# Patient Record
Sex: MALE | Race: WHITE | URBAN - METROPOLITAN AREA
[De-identification: names, ages, dates, MRNs, and addresses within clinical notes are randomized per-mention and may not be internally consistent; named-entity substitution may affect disease eponyms.]

---

## 2017-03-08 ENCOUNTER — OFFICE VISIT (OUTPATIENT)
Dept: FAMILY MEDICINE | Facility: CLINIC | Age: 57
End: 2017-03-08
Payer: COMMERCIAL

## 2017-03-08 VITALS
SYSTOLIC BLOOD PRESSURE: 124 MMHG | HEART RATE: 82 BPM | TEMPERATURE: 98.8 F | DIASTOLIC BLOOD PRESSURE: 82 MMHG | BODY MASS INDEX: 25.54 KG/M2 | RESPIRATION RATE: 16 BRPM | OXYGEN SATURATION: 99 % | WEIGHT: 178.4 LBS | HEIGHT: 70 IN

## 2017-03-08 DIAGNOSIS — E78.5 HYPERLIPIDEMIA LDL GOAL <130: ICD-10-CM

## 2017-03-08 DIAGNOSIS — Z11.59 NEED FOR HEPATITIS C SCREENING TEST: ICD-10-CM

## 2017-03-08 DIAGNOSIS — Z00.00 ROUTINE GENERAL MEDICAL EXAMINATION AT A HEALTH CARE FACILITY: Primary | ICD-10-CM

## 2017-03-08 DIAGNOSIS — G89.29 CHRONIC BILATERAL LOW BACK PAIN WITHOUT SCIATICA: ICD-10-CM

## 2017-03-08 DIAGNOSIS — R68.82 LOW LIBIDO: ICD-10-CM

## 2017-03-08 DIAGNOSIS — M54.50 CHRONIC BILATERAL LOW BACK PAIN WITHOUT SCIATICA: ICD-10-CM

## 2017-03-08 LAB
ALBUMIN SERPL-MCNC: 4.1 G/DL (ref 3.4–5)
ALBUMIN UR-MCNC: NEGATIVE MG/DL
ALP SERPL-CCNC: 74 U/L (ref 40–150)
ALT SERPL W P-5'-P-CCNC: 74 U/L (ref 0–70)
ANION GAP SERPL CALCULATED.3IONS-SCNC: 4 MMOL/L (ref 3–14)
APPEARANCE UR: CLEAR
AST SERPL W P-5'-P-CCNC: 36 U/L (ref 0–45)
BILIRUB SERPL-MCNC: 0.4 MG/DL (ref 0.2–1.3)
BILIRUB UR QL STRIP: NEGATIVE
BUN SERPL-MCNC: 12 MG/DL (ref 7–30)
CALCIUM SERPL-MCNC: 9.2 MG/DL (ref 8.5–10.1)
CHLORIDE SERPL-SCNC: 106 MMOL/L (ref 94–109)
CHOLEST SERPL-MCNC: 363 MG/DL
CO2 SERPL-SCNC: 30 MMOL/L (ref 20–32)
COLOR UR AUTO: YELLOW
CREAT SERPL-MCNC: 0.97 MG/DL (ref 0.66–1.25)
ERYTHROCYTE [DISTWIDTH] IN BLOOD BY AUTOMATED COUNT: 13.9 % (ref 10–15)
GFR SERPL CREATININE-BSD FRML MDRD: 80 ML/MIN/1.7M2
GLUCOSE SERPL-MCNC: 95 MG/DL (ref 70–99)
GLUCOSE UR STRIP-MCNC: NEGATIVE MG/DL
HCT VFR BLD AUTO: 49.8 % (ref 40–53)
HCV AB SERPL QL IA: NORMAL
HDLC SERPL-MCNC: 39 MG/DL
HGB BLD-MCNC: 16.7 G/DL (ref 13.3–17.7)
HGB UR QL STRIP: NEGATIVE
KETONES UR STRIP-MCNC: NEGATIVE MG/DL
LDLC SERPL CALC-MCNC: 271 MG/DL
LEUKOCYTE ESTERASE UR QL STRIP: NEGATIVE
MCH RBC QN AUTO: 29.7 PG (ref 26.5–33)
MCHC RBC AUTO-ENTMCNC: 33.5 G/DL (ref 31.5–36.5)
MCV RBC AUTO: 89 FL (ref 78–100)
NITRATE UR QL: NEGATIVE
NONHDLC SERPL-MCNC: 324 MG/DL
PH UR STRIP: 6 PH (ref 5–7)
PLATELET # BLD AUTO: 228 10E9/L (ref 150–450)
POTASSIUM SERPL-SCNC: 4 MMOL/L (ref 3.4–5.3)
PROT SERPL-MCNC: 7.4 G/DL (ref 6.8–8.8)
RBC # BLD AUTO: 5.62 10E12/L (ref 4.4–5.9)
SODIUM SERPL-SCNC: 140 MMOL/L (ref 133–144)
SP GR UR STRIP: <=1.005 (ref 1–1.03)
TRIGL SERPL-MCNC: 265 MG/DL
URN SPEC COLLECT METH UR: NORMAL
UROBILINOGEN UR STRIP-ACNC: 0.2 EU/DL (ref 0.2–1)
WBC # BLD AUTO: 6.3 10E9/L (ref 4–11)

## 2017-03-08 PROCEDURE — 80053 COMPREHEN METABOLIC PANEL: CPT | Performed by: FAMILY MEDICINE

## 2017-03-08 PROCEDURE — 86803 HEPATITIS C AB TEST: CPT | Performed by: FAMILY MEDICINE

## 2017-03-08 PROCEDURE — 81003 URINALYSIS AUTO W/O SCOPE: CPT | Performed by: FAMILY MEDICINE

## 2017-03-08 PROCEDURE — 80061 LIPID PANEL: CPT | Performed by: FAMILY MEDICINE

## 2017-03-08 PROCEDURE — 36415 COLL VENOUS BLD VENIPUNCTURE: CPT | Performed by: FAMILY MEDICINE

## 2017-03-08 PROCEDURE — 85027 COMPLETE CBC AUTOMATED: CPT | Performed by: FAMILY MEDICINE

## 2017-03-08 PROCEDURE — 99396 PREV VISIT EST AGE 40-64: CPT | Performed by: FAMILY MEDICINE

## 2017-03-08 PROCEDURE — 84403 ASSAY OF TOTAL TESTOSTERONE: CPT | Performed by: FAMILY MEDICINE

## 2017-03-08 NOTE — PROGRESS NOTES
SUBJECTIVE:     CC: Noble Blanco is an 56 year old male who presents for preventative health visit.     Healthy Habits:    Do you get at least three servings of calcium containing foods daily (dairy, green leafy vegetables, etc.)? yes    Amount of exercise or daily activities, outside of work: 4 day(s) per week    Problems taking medications regularly No    Medication side effects: No    Have you had an eye exam in the past two years? yes    Do you see a dentist twice per year? yes    Do you have sleep apnea, excessive snoring or daytime drowsiness?no    Patient with significant hyperlipidemia in the past however has had myalgias with atorvastatin as well as just periodic use of Crestor at 5 mg.    Today's PHQ-2 Score:   PHQ-2 ( 1999 Pfizer) 6/30/2016 6/11/2015   Q1: Little interest or pleasure in doing things 0 -   Q2: Feeling down, depressed or hopeless 0 0   PHQ-2 Score 0 -     Abuse: Current or Past(Physical, Sexual or Emotional)- No  Do you feel safe in your environment - Yes    Social History   Substance Use Topics     Smoking status: Never Smoker     Smokeless tobacco: Never Used     Alcohol use No     The patient does not drink >3 drinks per day nor >7 drinks per week.    Last PSA:   PSA   Date Value Ref Range Status   05/10/2016 1.80 ng/mL Final     Recent Labs   Lab Test 05/10/16  06/11/15   0832  04/07/14   0825   CHOL  346  174  189   HDL  46  47  39*   LDL  248  99  122   TRIG  258  142  138   CHOLHDLRATIO   --   3.7  4.9     Reviewed orders with patient. Reviewed health maintenance and updated orders accordingly - Yes    Reviewed and updated as needed this visit by clinical staff  Tobacco  Allergies  Meds  Problems  Med Hx  Surg Hx  Fam Hx  Soc Hx        Reviewed and updated as needed this visit by Provider  Allergies  Meds  Problems        Past Medical History   Diagnosis Date     Hyperlipidemia       Past Surgical History   Procedure Laterality Date     C nonspecific procedure       S/P  "T&A (septal turbination repair)     C nonspecific procedure       S/P Vasectomy     C nonspecific procedure       Right foot fracture     C nonspecific procedure       L1-L2      Orthopedic surgery       rt shoulder surgery     Appendectomy       Colonoscopy  9/21/2012     Procedure: COLONOSCOPY;  Colonoscopy  ;  Surgeon: Vahid Kuhn MD;  Location: [facility]     ROS:  C: NEGATIVE for fever, chills, change in weight  I: NEGATIVE for worrisome rashes, moles or lesions  E: NEGATIVE for vision changes or irritation  ENT: NEGATIVE for ear, mouth and throat problems  R: NEGATIVE for significant cough or SOB  CV: NEGATIVE for chest pain, palpitations or peripheral edema  GI: NEGATIVE for nausea, abdominal pain, heartburn, or change in bowel habits   male: negative for dysuria, hematuria, decreased urinary stream, erectile dysfunction, urethral discharge  M: NEGATIVE for significant arthralgias or myalgia  N: NEGATIVE for weakness, dizziness or paresthesias  P: NEGATIVE for changes in mood or affect    Problem list, Medication list, Allergies, and Medical/Social/Surgical histories reviewed in EPIC and updated as appropriate.  OBJECTIVE:     /82 (BP Location: Right arm, Patient Position: Chair, Cuff Size: Adult Large)  Pulse 82  Temp 98.8  F (37.1  C) (Oral)  Resp 16  Ht 5' 10\" (1.778 m)  Wt 178 lb 6.4 oz (80.9 kg)  SpO2 99%  BMI 25.6 kg/m2  EXAM:  GENERAL: healthy, alert and no distress  EYES: Eyes grossly normal to inspection, PERRL and conjunctivae and sclerae normal  HENT: ear canals and TM's normal, nose and mouth without ulcers or lesions  NECK: no adenopathy, no asymmetry, masses, or scars and thyroid normal to palpation  RESP: lungs clear to auscultation - no rales, rhonchi or wheezes  CV: regular rate and rhythm, normal S1 S2, no S3 or S4, no murmur, click or rub, no peripheral edema and peripheral pulses strong  ABDOMEN: soft, nontender, no hepatosplenomegaly, no masses and bowel sounds normal   " "(male): normal male genitalia without lesions or urethral discharge, no hernia  MS: no gross musculoskeletal defects noted, no edema  SKIN: no suspicious lesions or rashes  NEURO: Normal strength and tone, mentation intact and speech normal  PSYCH: mentation appears normal, affect normal/bright    ASSESSMENT/PLAN:     1. Routine general medical examination at a health care facility  - Comprehensive metabolic panel    2. Hyperlipidemia LDL goal <130  - Lipid panel reflex to direct LDL  - UA reflex to Microscopic and Culture  - CBC with platelets    3. Low libido  - Testosterone total    4. Need for hepatitis C screening test  - Hepatitis C Screen Reflex to HCV RNA Quant and Genotype    5. Chronic bilateral low back pain without sciatica    COUNSELING:  Reviewed preventive health counseling, as reflected in patient instructions    BP Screening:   Last 3 BP Readings:    BP Readings from Last 3 Encounters:   03/08/17 124/82   06/30/16 124/74   06/11/15 110/80       The following was recommended to the patient:  Re-screen BP within a year and recommended lifestyle modifications     reports that he has never smoked. He has never used smokeless tobacco.    Estimated body mass index is 25.6 kg/(m^2) as calculated from the following:    Height as of this encounter: 5' 10\" (1.778 m).    Weight as of this encounter: 178 lb 6.4 oz (80.9 kg).   Weight management plan: Discussed healthy diet and exercise guidelines and patient will follow up in 12 months in clinic to re-evaluate.    Counseling Resources:  ATP IV Guidelines  Pooled Cohorts Equation Calculator  FRAX Risk Assessment  ICSI Preventive Guidelines  Dietary Guidelines for Americans, 2010  USDA's MyPlate  ASA Prophylaxis  Lung CA Screening    Ferdinand Gutierrez MD  [facility]  "

## 2017-03-08 NOTE — NURSING NOTE
"Chief Complaint   Patient presents with     Physical       Initial /82 (BP Location: Right arm, Patient Position: Chair, Cuff Size: Adult Large)  Pulse 82  Temp 98.8  F (37.1  C) (Oral)  Resp 16  Ht 5' 10\" (1.778 m)  Wt 178 lb 6.4 oz (80.9 kg)  SpO2 99%  BMI 25.6 kg/m2 Estimated body mass index is 25.6 kg/(m^2) as calculated from the following:    Height as of this encounter: 5' 10\" (1.778 m).    Weight as of this encounter: 178 lb 6.4 oz (80.9 kg).    Medication Reconciliation: complete    Ana Maria Jacinto, [facility]      Health Maintenance- Has Been Reviewed.                "

## 2017-03-08 NOTE — MR AVS SNAPSHOT
After Visit Summary   3/8/2017    Noble Blanco    MRN: [id number]           Patient Information     Date Of Birth          1960        Visit Information        Provider Department      3/8/2017 7:30 AM Ferdinand Gutierrez MD [facility]        Today's Diagnoses     Routine general medical examination at a health care facility    -  1    Hyperlipidemia LDL goal <130        Low libido        Need for hepatitis C screening test          Care Instructions      Preventive Health Recommendations  Male Ages 50 - 64    Yearly exam:             See your health care provider every year in order to  o   Review health changes.   o   Discuss preventive care.    o   Review your medicines if your doctor has prescribed any.     Have a cholesterol test every 5 years, or more frequently if you are at risk for high cholesterol/heart disease.     Have a diabetes test (fasting glucose) every three years. If you are at risk for diabetes, you should have this test more often.     Have a colonoscopy at age 50, or have a yearly FIT test (stool test). These exams will check for colon cancer.      Talk with your health care provider about whether or not a prostate cancer screening test (PSA) is right for you.    You should be tested each year for STDs (sexually transmitted diseases), if you re at risk.     Shots: Get a flu shot each year. Get a tetanus shot every 10 years.     Nutrition:    Eat at least 5 servings of fruits and vegetables daily.     Eat whole-grain bread, whole-wheat pasta and brown rice instead of white grains and rice.     Talk to your provider about Calcium and Vitamin D.     Lifestyle    Exercise for at least 150 minutes a week (30 minutes a day, 5 days a week). This will help you control your weight and prevent disease.     Limit alcohol to one drink per day.     No smoking.     Wear sunscreen to prevent skin cancer.     See your dentist every six months for an exam and cleaning.  "    See your eye doctor every 1 to 2 years.          Follow-ups after your visit        Who to contact     If you have questions or need follow up information about today's clinic visit or your schedule please contact [facility] directly at [phone].  Normal or non-critical lab and imaging results will be communicated to you by MyChart, letter or phone within 4 business days after the clinic has received the results. If you do not hear from us within 7 days, please contact the clinic through [employer] or phone. If you have a critical or abnormal lab result, we will notify you by phone as soon as possible.  Submit refill requests through [employer] or call your pharmacy and they will forward the refill request to us. Please allow 3 business days for your refill to be completed.          Additional Information About Your Visit        [employer] Information     [employer] gives you secure access to your electronic health record. If you see a primary care provider, you can also send messages to your care team and make appointments. If you have questions, please call your primary care clinic.  If you do not have a primary care provider, please call [phone] and they will assist you.        Care EveryWhere ID     This is your Care EveryWhere ID. This could be used by other organizations to access your [place] medical records  [id number]        Your Vitals Were     Pulse Temperature Respirations Height Pulse Oximetry BMI (Body Mass Index)    82 98.8  F (37.1  C) (Oral) 16 5' 10\" (1.778 m) 99% 25.6 kg/m2       Blood Pressure from Last 3 Encounters:   03/08/17 124/82   06/30/16 124/74   06/11/15 110/80    Weight from Last 3 Encounters:   03/08/17 178 lb 6.4 oz (80.9 kg)   06/30/16 172 lb (78 kg)   06/11/15 169 lb (76.7 kg)              We Performed the Following     CBC with platelets     Comprehensive metabolic panel     Hepatitis C Screen Reflex to HCV RNA Quant and Genotype     Lipid panel reflex to direct " LDL     Testosterone total     UA reflex to Microscopic and Culture          Today's Medication Changes          These changes are accurate as of: 3/8/17  8:22 AM.  If you have any questions, ask your nurse or doctor.               Stop taking these medicines if you haven't already. Please contact your care team if you have questions.     aspirin 81 MG tablet   Stopped by:  Ferdinand Gutierrez MD           rosuvastatin 5 MG tablet   Commonly known as:  CRESTOR   Stopped by:  Ferdinand Gutierrez MD                    Primary Care Provider Office Phone # Fax #    Ferdinand Gutierrez [phone]       [facility] [address]  [facility] [address]        Thank you!     Thank you for choosing [facility]  for your care. Our goal is always to provide you with excellent care. Hearing back from our patients is one way we can continue to improve our services. Please take a few minutes to complete the written survey that you may receive in the mail after your visit with us. Thank you!             Your Updated Medication List - Protect others around you: Learn how to safely use, store and throw away your medicines at www.disposemymeds.org.          This list is accurate as of: 3/8/17  8:22 AM.  Always use your most recent med list.                   Brand Name Dispense Instructions for use    fexofenadine 180 MG tablet    ALLEGRA     Take by mouth daily       FLEXERIL PO      Take 10 mg by mouth once as needed for muscle spasms       melatonin 3 MG tablet     30 tablet    Take 1 tablet by mouth nightly as needed for sleep.       multivitamin per tablet     100 tablet    Take 1 tablet by mouth daily.       SUPER OMEGA 3 EPA/DHA 1000 MG Caps      Take  by mouth At Bedtime.       vitamin D 1000 UNITS capsule      Take 2,000 Units by mouth daily

## 2017-03-08 NOTE — PATIENT INSTRUCTIONS
Preventive Health Recommendations  Male Ages 50   64    Yearly exam:             See your health care provider every year in order to  o   Review health changes.   o   Discuss preventive care.    o   Review your medicines if your doctor has prescribed any.     Have a cholesterol test every 5 years, or more frequently if you are at risk for high cholesterol/heart disease.     Have a diabetes test (fasting glucose) every three years. If you are at risk for diabetes, you should have this test more often.     Have a colonoscopy at age 50, or have a yearly FIT test (stool test). These exams will check for colon cancer.      Talk with your health care provider about whether or not a prostate cancer screening test (PSA) is right for you.    You should be tested each year for STDs (sexually transmitted diseases), if you re at risk.     Shots: Get a flu shot each year. Get a tetanus shot every 10 years.     Nutrition:    Eat at least 5 servings of fruits and vegetables daily.     Eat whole-grain bread, whole-wheat pasta and brown rice instead of white grains and rice.     Talk to your provider about Calcium and Vitamin D.     Lifestyle    Exercise for at least 150 minutes a week (30 minutes a day, 5 days a week). This will help you control your weight and prevent disease.     Limit alcohol to one drink per day.     No smoking.     Wear sunscreen to prevent skin cancer.     See your dentist every six months for an exam and cleaning.     See your eye doctor every 1 to 2 years.

## 2017-03-10 PROBLEM — E78.00 PURE HYPERCHOLESTEROLEMIA: Status: ACTIVE | Noted: 2017-03-10

## 2017-03-10 LAB — TESTOST SERPL-MCNC: 614 NG/DL (ref 240–950)

## 2017-09-25 ENCOUNTER — OFFICE VISIT (OUTPATIENT)
Dept: FAMILY MEDICINE | Facility: CLINIC | Age: 57
End: 2017-09-25
Payer: COMMERCIAL

## 2017-09-25 ENCOUNTER — RADIANT APPOINTMENT (OUTPATIENT)
Dept: GENERAL RADIOLOGY | Facility: CLINIC | Age: 57
End: 2017-09-25
Attending: PHYSICIAN ASSISTANT
Payer: COMMERCIAL

## 2017-09-25 VITALS — TEMPERATURE: 98.2 F | HEART RATE: 72 BPM | DIASTOLIC BLOOD PRESSURE: 78 MMHG | SYSTOLIC BLOOD PRESSURE: 130 MMHG

## 2017-09-25 DIAGNOSIS — S69.91XA INJURY OF RIGHT LITTLE FINGER, INITIAL ENCOUNTER: ICD-10-CM

## 2017-09-25 DIAGNOSIS — S69.91XA INJURY OF RIGHT LITTLE FINGER, INITIAL ENCOUNTER: Primary | ICD-10-CM

## 2017-09-25 PROCEDURE — 99213 OFFICE O/P EST LOW 20 MIN: CPT | Performed by: PHYSICIAN ASSISTANT

## 2017-09-25 PROCEDURE — 73140 X-RAY EXAM OF FINGER(S): CPT | Mod: RT

## 2017-09-25 NOTE — NURSING NOTE
"Chief Complaint   Patient presents with     Trauma     R hand injury       Initial /78 (BP Location: Right arm, Patient Position: Chair, Cuff Size: Adult Regular)  Pulse 72  Temp 98.2  F (36.8  C) (Oral) Estimated body mass index is 25.6 kg/(m^2) as calculated from the following:    Height as of 3/8/17: 5' 10\" (1.778 m).    Weight as of 3/8/17: 178 lb 6.4 oz (80.9 kg).  Medication Reconciliation: complete     Mckenzie Miller CMA (AAMA)        "

## 2017-09-25 NOTE — MR AVS SNAPSHOT
After Visit Summary   9/25/2017    Noble Blanco    MRN: [id number]           Patient Information     Date Of Birth          1960        Visit Information        Provider Department      9/25/2017 4:30 PM Roland Kan PA-C [facility]        Today's Diagnoses     Injury of right little finger, initial encounter    -  1       Follow-ups after your visit        Additional Services     ORTHO  REFERRAL       [facility] is referring you to the Orthopedic  Services at [facility].       The  Representative will assist you in the coordination of your Orthopedic and Musculoskeletal Care as prescribed by your physician.    The  Representative will call you within 1 business day to help schedule your appointment, or you may contact the  Representative at:    All areas ~ [phone]     Type of Referral : Hand Surgeon       Timeframe requested: 3 - 5 days    Coverage of these services is subject to the terms and limitations of your health insurance plan.  Please call member services at your health plan with any benefit or coverage questions.      If X-rays, CT or MRI's have been performed, please contact the facility where they were done to arrange for , prior to your scheduled appointment.  Please bring this referral request to your appointment and present it to your specialist.    3 week old injury to PIP joint with decreased ROM and PIP joint swelling                  Who to contact     If you have questions or need follow up information about today's clinic visit or your schedule please contact [facility] directly at [phone].  Normal or non-critical lab and imaging results will be communicated to you by MyChart, letter or phone within 4 business days after the clinic has received the results. If you do not hear from us within 7 days, please contact the clinic through  [employer] or phone. If you have a critical or abnormal lab result, we will notify you by phone as soon as possible.  Submit refill requests through [employer] or call your pharmacy and they will forward the refill request to us. Please allow 3 business days for your refill to be completed.          Additional Information About Your Visit        [employer] Information     [employer] gives you secure access to your electronic health record. If you see a primary care provider, you can also send messages to your care team and make appointments. If you have questions, please call your primary care clinic.  If you do not have a primary care provider, please call [phone] and they will assist you.        Care EveryWhere ID     This is your Care EveryWhere ID. This could be used by other organizations to access your [place] medical records  [id number]        Your Vitals Were     Pulse Temperature                72 98.2  F (36.8  C) (Oral)           Blood Pressure from Last 3 Encounters:   09/25/17 130/78   03/08/17 124/82   06/30/16 124/74    Weight from Last 3 Encounters:   03/08/17 178 lb 6.4 oz (80.9 kg)   06/30/16 172 lb (78 kg)   06/11/15 169 lb (76.7 kg)              We Performed the Following     ORTHO  REFERRAL        Primary Care Provider Office Phone # Fax #    Ferdinand Gutierrez [phone] [address]  [facility] [address]        Equal Access to Services     [facility] AH: Hadii aad ku hadasho Soomaali, waaxda luqadaha, qaybta kaalmada adeegyada, konrad suazo . So [facility] [phone].    ATENCIÓN: Si habla español, tiene a matamoros disposición servicios gratuitos de asistencia lingüística. Llame al [phone].    We comply with applicable federal civil rights laws and [place] laws. We do not discriminate on the basis of race, color, national origin, age, disability sex, sexual orientation or gender identity.            Thank you!     Thank you for choosing  [facility]  for your care. Our goal is always to provide you with excellent care. Hearing back from our patients is one way we can continue to improve our services. Please take a few minutes to complete the written survey that you may receive in the mail after your visit with us. Thank you!             Your Updated Medication List - Protect others around you: Learn how to safely use, store and throw away your medicines at www.disposemymeds.org.          This list is accurate as of: 9/25/17  4:51 PM.  Always use your most recent med list.                   Brand Name Dispense Instructions for use Diagnosis    fexofenadine 180 MG tablet    ALLEGRA     Take by mouth daily        FLEXERIL PO      Take 10 mg by mouth once as needed for muscle spasms        melatonin 3 MG tablet     30 tablet    Take 1 tablet by mouth nightly as needed for sleep.        multivitamin per tablet     100 tablet    Take 1 tablet by mouth daily.        SUPER OMEGA 3 EPA/DHA 1000 MG Caps      Take  by mouth At Bedtime.        vitamin D 1000 UNITS capsule      Take 2,000 Units by mouth daily

## 2017-09-25 NOTE — PROGRESS NOTES
SUBJECTIVE:   Noble Blanco is a 56 year old male who presents to clinic today for the following health issues:      Pt stated that he was working on a deck and went down fast and hurt his R pinky -2.5 weeks ago, bent wrong and had been swollen but got better. Still swollen.    SUBJECTIVE:  Chief Complaint   Patient presents with     Trauma     R hand injury     Noble Blanco is a 56 year old male presents with a chief complaint of right small finger PIP joint pain and swelling   This is a work comp related injury date of injury was 3 weeks ago.  He had sustained a crush injury when he was working on a deck and the deck fell down and crushed his right pinky finger.  At the time it has swollen and now the swelling has Improved but not has not completely resolved.      Past Medical History:   Diagnosis Date     Hyperlipidemia      Current Outpatient Prescriptions   Medication Sig Dispense Refill     fexofenadine (ALLEGRA) 180 MG tablet Take by mouth daily       Cyclobenzaprine HCl (FLEXERIL PO) Take 10 mg by mouth once as needed for muscle spasms       Omega-3 Fatty Acids (SUPER OMEGA 3 EPA/DHA) 1000 MG CAPS Take  by mouth At Bedtime.       Cholecalciferol (VITAMIN D) 1000 UNITS capsule Take 2,000 Units by mouth daily        Multiple Vitamin (MULTIVITAMIN) per tablet Take 1 tablet by mouth daily. 100 tablet 12     melatonin 3 MG tablet Take 1 tablet by mouth nightly as needed for sleep. 30 tablet 0     Social History   Substance Use Topics     Smoking status: Never Smoker     Smokeless tobacco: Never Used     Alcohol use No     ROS:  Review of systems negative except as stated above.    EXAM:   /78 (BP Location: Right arm, Patient Position: Chair, Cuff Size: Adult Regular)  Pulse 72  Temp 98.2  F (36.8  C) (Oral)  Gen: healthy, alert, no distress and healthy,alert,no distress  Focused examination of the  right hand shows that there is no swelling except for about the PIP joint of the small finger.  He can  make a good close tightness.  He sensory intact to light touch proximal to the distal radial sequel to ulnar.    X-RAY was done.  No evidence of fracture.  This is mild personal interpretation radiologist overread films    ASSESSMENT:   (S69.91XA) Injury of right little finger, initial encounter  (primary encounter diagnosis)    PLAN:  Because this is a work comp related injury, he is sent to orthopedics for definitive evaluation and treatment.

## 2018-06-06 ENCOUNTER — OFFICE VISIT (OUTPATIENT)
Dept: FAMILY MEDICINE | Facility: CLINIC | Age: 58
End: 2018-06-06
Payer: COMMERCIAL

## 2018-06-06 VITALS
HEIGHT: 70 IN | HEART RATE: 75 BPM | DIASTOLIC BLOOD PRESSURE: 78 MMHG | TEMPERATURE: 98.7 F | SYSTOLIC BLOOD PRESSURE: 124 MMHG | BODY MASS INDEX: 24.57 KG/M2 | WEIGHT: 171.6 LBS | OXYGEN SATURATION: 98 %

## 2018-06-06 DIAGNOSIS — Z00.00 ROUTINE GENERAL MEDICAL EXAMINATION AT A HEALTH CARE FACILITY: Primary | ICD-10-CM

## 2018-06-06 DIAGNOSIS — E78.00 PURE HYPERCHOLESTEROLEMIA: ICD-10-CM

## 2018-06-06 DIAGNOSIS — E78.5 HYPERLIPIDEMIA LDL GOAL <130: ICD-10-CM

## 2018-06-06 DIAGNOSIS — Z12.5 SCREENING FOR PROSTATE CANCER: ICD-10-CM

## 2018-06-06 LAB
ALBUMIN SERPL-MCNC: 4 G/DL (ref 3.4–5)
ALP SERPL-CCNC: 68 U/L (ref 40–150)
ALT SERPL W P-5'-P-CCNC: 34 U/L (ref 0–70)
ANION GAP SERPL CALCULATED.3IONS-SCNC: 8 MMOL/L (ref 3–14)
AST SERPL W P-5'-P-CCNC: 15 U/L (ref 0–45)
BILIRUB SERPL-MCNC: 0.7 MG/DL (ref 0.2–1.3)
BUN SERPL-MCNC: 13 MG/DL (ref 7–30)
CALCIUM SERPL-MCNC: 9.6 MG/DL (ref 8.5–10.1)
CHLORIDE SERPL-SCNC: 106 MMOL/L (ref 94–109)
CHOLEST SERPL-MCNC: 312 MG/DL
CO2 SERPL-SCNC: 28 MMOL/L (ref 20–32)
CREAT SERPL-MCNC: 1.03 MG/DL (ref 0.66–1.25)
GFR SERPL CREATININE-BSD FRML MDRD: 75 ML/MIN/1.7M2
GLUCOSE SERPL-MCNC: 91 MG/DL (ref 70–99)
HDLC SERPL-MCNC: 41 MG/DL
LDLC SERPL CALC-MCNC: 231 MG/DL
NONHDLC SERPL-MCNC: 271 MG/DL
POTASSIUM SERPL-SCNC: 4.8 MMOL/L (ref 3.4–5.3)
PROT SERPL-MCNC: 7.2 G/DL (ref 6.8–8.8)
PSA SERPL-ACNC: 3.16 UG/L (ref 0–4)
SODIUM SERPL-SCNC: 142 MMOL/L (ref 133–144)
TRIGL SERPL-MCNC: 200 MG/DL

## 2018-06-06 PROCEDURE — 80061 LIPID PANEL: CPT | Performed by: FAMILY MEDICINE

## 2018-06-06 PROCEDURE — 80053 COMPREHEN METABOLIC PANEL: CPT | Performed by: FAMILY MEDICINE

## 2018-06-06 PROCEDURE — G0103 PSA SCREENING: HCPCS | Performed by: FAMILY MEDICINE

## 2018-06-06 PROCEDURE — 36415 COLL VENOUS BLD VENIPUNCTURE: CPT | Performed by: FAMILY MEDICINE

## 2018-06-06 PROCEDURE — 99396 PREV VISIT EST AGE 40-64: CPT | Performed by: FAMILY MEDICINE

## 2018-06-06 NOTE — MR AVS SNAPSHOT
After Visit Summary   6/6/2018    Noble Blanco    MRN: [id number]           Patient Information     Date Of Birth          1960        Visit Information        Provider Department      6/6/2018 8:20 AM Ferdinand Gutierrez MD [facility]        Today's Diagnoses     Routine general medical examination at a health care facility    -  1    Hyperlipidemia LDL goal <130        Pure hypercholesterolemia        Screening for prostate cancer          Care Instructions      Preventive Health Recommendations  Male Ages 50 - 64    Yearly exam:             See your health care provider every year in order to  o   Review health changes.   o   Discuss preventive care.    o   Review your medicines if your doctor has prescribed any.     Have a cholesterol test every 5 years, or more frequently if you are at risk for high cholesterol/heart disease.     Have a diabetes test (fasting glucose) every three years. If you are at risk for diabetes, you should have this test more often.     Have a colonoscopy at age 50, or have a yearly FIT test (stool test). These exams will check for colon cancer.      Talk with your health care provider about whether or not a prostate cancer screening test (PSA) is right for you.    You should be tested each year for STDs (sexually transmitted diseases), if you re at risk.     Shots: Get a flu shot each year. Get a tetanus shot every 10 years.     Nutrition:    Eat at least 5 servings of fruits and vegetables daily.     Eat whole-grain bread, whole-wheat pasta and brown rice instead of white grains and rice.     Talk to your provider about Calcium and Vitamin D.     Lifestyle    Exercise for at least 150 minutes a week (30 minutes a day, 5 days a week). This will help you control your weight and prevent disease.     Limit alcohol to one drink per day.     No smoking.     Wear sunscreen to prevent skin cancer.     See your dentist every six months for an exam and  cleaning.     See your eye doctor every 1 to 2 years.            Follow-ups after your visit        Additional Services     CARDIOLOGY EVAL ADULT REFERRAL       Preferred location:  [facility] [phone]   [email or web address]    Please be aware that coverage of these services is subject to the terms and limitations of your health insurance plan.  Call member services at your health plan with any benefit or coverage questions.      Please bring the following to your appointment:  Any x-rays, CTs or MRIs which have been performed. Contact the facility where they were done to arrange for  prior to your scheduled appointment.    List of current medications  This referral request   Any documents/labs given to you for this referral                  Who to contact     If you have questions or need follow up information about today's clinic visit or your schedule please contact [facility] directly at [phone].  Normal or non-critical lab and imaging results will be communicated to you by MyChart, letter or phone within 4 business days after the clinic has received the results. If you do not hear from us within 7 days, please contact the clinic through [employer] or phone. If you have a critical or abnormal lab result, we will notify you by phone as soon as possible.  Submit refill requests through [employer] or call your pharmacy and they will forward the refill request to us. Please allow 3 business days for your refill to be completed.          Additional Information About Your Visit        [employer] Information     [employer] gives you secure access to your electronic health record. If you see a primary care provider, you can also send messages to your care team and make appointments. If you have questions, please call your primary care clinic.  If you do not have a primary care provider, please call [phone] and  "they will assist you.        Care EveryWhere ID     This is your Care EveryWhere ID. This could be used by other organizations to access your [place] medical records  [id number]        Your Vitals Were     Pulse Temperature Height Pulse Oximetry BMI (Body Mass Index)       75 98.7  F (37.1  C) (Oral) 5' 10\" (1.778 m) 98% 24.62 kg/m2        Blood Pressure from Last 3 Encounters:   06/06/18 124/78   09/25/17 130/78   03/08/17 124/82    Weight from Last 3 Encounters:   06/06/18 171 lb 9.6 oz (77.8 kg)   03/08/17 178 lb 6.4 oz (80.9 kg)   06/30/16 172 lb (78 kg)              We Performed the Following     CARDIOLOGY EVAL ADULT REFERRAL     Comprehensive metabolic panel     Lipid panel reflex to direct LDL Fasting     Prostate spec antigen screen        Primary Care Provider Office Phone # Fax #    Ferdinand Gutierrez [phone] [address]  [facility] [address]        Equal Access to Services     [facility]Y [facility] : Hadii aad ku hadasho Soomaali, waaxda luqadaha, qaybta kaalmada adeegyada, waxay wally hayprakash suazo . So [facility] [phone].    ATENCIÓN: Si habla español, tiene a matamoros disposición servicios gratuitos de asistencia lingüística. Llame al [phone].    We comply with applicable federal civil rights laws and [place] laws. We do not discriminate on the basis of race, color, national origin, age, disability, sex, sexual orientation, or gender identity.            Thank you!     Thank you for choosing [facility]  for your care. Our goal is always to provide you with excellent care. Hearing back from our patients is one way we can continue to improve our services. Please take a few minutes to complete the written survey that you may receive in the mail after your visit with us. Thank you!             Your Updated Medication List - Protect others around you: Learn how to safely use, store and throw away your medicines at www.disposemymeds.org.          This " list is accurate as of 6/6/18  9:06 AM.  Always use your most recent med list.                   Brand Name Dispense Instructions for use Diagnosis    fexofenadine 180 MG tablet    ALLEGRA     Take by mouth daily        FLEXERIL PO      Take 10 mg by mouth once as needed for muscle spasms        melatonin 3 MG tablet     30 tablet    Take 1 tablet by mouth nightly as needed for sleep.        multivitamin per tablet     100 tablet    Take 1 tablet by mouth daily.        SUPER OMEGA 3 EPA/DHA 1000 MG Caps      Take  by mouth At Bedtime.        vitamin D 1000 units capsule      Take 2,000 Units by mouth daily

## 2018-06-06 NOTE — PROGRESS NOTES
SUBJECTIVE:   CC: Noble Blanco is an 57 year old male who presents for preventative health visit.   Answers for HPI/ROS submitted by the patient on 6/6/2018   PHQ-2 Score: 1    Physical   Annual:     Getting at least 3 servings of Calcium per day::  Yes    Bi-annual eye exam::  Yes    Dental care twice a year::  Yes    Sleep apnea or symptoms of sleep apnea::  None    Diet::  Regular (no restrictions)    Taking medications regularly::  Yes    Medication side effects::  Muscle aches    Additional concerns today::  YES            Patient with history of significant hyperlipidemia. However, has had myalgias with atorvastatin as well as just periodic use of Crestor at 5 mg.    Patient reports occasional chest pain. The chest pain lasts for fifteen or thirty minutes. Patient is unsure of triggers or settings in which this occurs.     Patient reports occasional lightheadedness/dizziness while standing up.     Today's PHQ-2 Score:   PHQ-2 ( 1999 Pfizer) 6/6/2018   Q1: Little interest or pleasure in doing things 1   Q2: Feeling down, depressed or hopeless 0   PHQ-2 Score 1   Q1: Little interest or pleasure in doing things Several days   Q2: Feeling down, depressed or hopeless Not at all   PHQ-2 Score 1       Abuse: Current or Past(Physical, Sexual or Emotional)- No  Do you feel safe in your environment -Yes    Social History   Substance Use Topics     Smoking status: Never Smoker     Smokeless tobacco: Never Used     Alcohol use No     Alcohol Use 6/6/2018   If you drink alcohol do you typically have greater than 3 drinks per day OR greater than 7 drinks per week? No   No flowsheet data found.    Last PSA:   PSA   Date Value Ref Range Status   05/10/2016 1.80 ng/mL Final       Reviewed orders with patient. Reviewed health maintenance and updated orders accordingly - Yes  Patient Active Problem List   Diagnosis     Allergic rhinitis     Hyperlipidemia LDL goal <130     Pure hypercholesterolemia     Past Surgical History:    Procedure Laterality Date     APPENDECTOMY       C NONSPECIFIC PROCEDURE      S/P T&A (septal turbination repair)     C NONSPECIFIC PROCEDURE      S/P Vasectomy     C NONSPECIFIC PROCEDURE      Right foot fracture     C NONSPECIFIC PROCEDURE      L1-L2      COLONOSCOPY  9/21/2012    Procedure: COLONOSCOPY;  Colonoscopy  ;  Surgeon: Vahid Kuhn MD;  Location:  GI     ORTHOPEDIC SURGERY      rt shoulder surgery       Social History   Substance Use Topics     Smoking status: Never Smoker     Smokeless tobacco: Never Used     Alcohol use No     Family History   Problem Relation Age of Onset     Hypertension Father      Lipids Father      CANCER Paternal Grandmother      intestinal cancer     CEREBROVASCULAR DISEASE Paternal Grandfather 76     Coronary Artery Disease Mother 65     Stent     Family History Negative No family hx of            Reviewed and updated as needed this visit by clinical staff  Tobacco  Allergies  Meds  Problems  Med Hx  Surg Hx  Fam Hx  Soc Hx          Reviewed and updated as needed this visit by Provider  Allergies  Meds  Problems        Past Medical History:   Diagnosis Date     Hyperlipidemia       Past Surgical History:   Procedure Laterality Date     APPENDECTOMY       C NONSPECIFIC PROCEDURE      S/P T&A (septal turbination repair)     C NONSPECIFIC PROCEDURE      S/P Vasectomy     C NONSPECIFIC PROCEDURE      Right foot fracture     C NONSPECIFIC PROCEDURE      L1-L2      COLONOSCOPY  9/21/2012    Procedure: COLONOSCOPY;  Colonoscopy  ;  Surgeon: Vahid Kuhn MD;  Location:  GI     ORTHOPEDIC SURGERY      rt shoulder surgery       Review of Systems  CONSTITUTIONAL: NEGATIVE for fever, chills, change in weight  INTEGUMENTARY/SKIN: NEGATIVE for worrisome rashes, moles or lesions  EYES: NEGATIVE for vision changes or irritation  ENT: NEGATIVE for ear, mouth and throat problems  RESP: NEGATIVE for significant cough or SOB  CV: as noted above, otherwise NEGATIVE for  "palpitations or peripheral edema  GI: NEGATIVE for nausea, abdominal pain, heartburn, or change in bowel habits   male: negative for dysuria, hematuria, decreased urinary stream, erectile dysfunction, urethral discharge  MUSCULOSKELETAL: NEGATIVE for significant arthralgias or myalgia  NEURO: as noted above, otherwise NEGATIVE for weakness or paresthesias  PSYCHIATRIC: NEGATIVE for changes in mood or affect    This document serves as a record of the services and decisions personally performed and made by Ferdinand Gutierrez MD. It was created on his behalf by Tata Palomino, a trained medical scribe. The creation of this document is based on the provider's statements to the medical scribe.  Tata Palomino 8:35 AM June 6, 2018    OBJECTIVE:   /78 (BP Location: Right arm, Patient Position: Chair, Cuff Size: Adult Regular)  Pulse 75  Temp 98.7  F (37.1  C) (Oral)  Ht 1.778 m (5' 10\")  Wt 77.8 kg (171 lb 9.6 oz)  SpO2 98%  BMI 24.62 kg/m2    Physical Exam  GENERAL: healthy, alert and no distress  EYES: Eyes grossly normal to inspection, PERRL and conjunctivae and sclerae normal  HENT: ear canals and TM's normal, nose and mouth without ulcers or lesions  NECK: no adenopathy, no asymmetry, masses, or scars and thyroid normal to palpation  RESP: lungs clear to auscultation - no rales, rhonchi or wheezes  CV: regular rate and rhythm, normal S1 S2, no S3 or S4, no murmur, click or rub, no peripheral edema and peripheral pulses strong  ABDOMEN: soft, nontender, no hepatosplenomegaly, no masses and bowel sounds normal   (male): normal male genitalia without lesions or urethral discharge, no hernia  MS: no gross musculoskeletal defects noted, no edema  SKIN: no suspicious lesions or rashes  NEURO: Normal strength and tone, mentation intact and speech normal  PSYCH: mentation appears normal, affect normal/bright    ASSESSMENT/PLAN:   1. Routine general medical examination at a health care facility    2. Hyperlipidemia " "LDL goal <130  - CARDIOLOGY EVAL ADULT REFERRAL  - Comprehensive metabolic panel  - Lipid panel reflex to direct LDL Fasting    3. Pure hypercholesterolemia  - CARDIOLOGY EVAL ADULT REFERRAL  - Lipid panel reflex to direct LDL Fasting    4. Screening for prostate cancer  Discussed risk and benefit of prostate cancer screening with PSA testing including benefit of early detection and risk of unnecessary biopsy and patient anxiety and complications of treatment on cancer that may not have affected patient's health.  Patient desires PSA testing at this time.  - Prostate spec antigen screen    COUNSELING:   Reviewed preventive health counseling, as reflected in patient instructions  Special attention given to:        Regular exercise       Healthy diet/nutrition       HIV screeninx in teen years, 1x in adult years, and at intervals if high risk       Prostate cancer screening    Discussed risk and benefit of prostate cancer screening with PSA testing including benefit of early detection and risk of unnecessary biopsy and patient anxiety and complications of treatment on cancer that may not have affected patient's health.  Patient desires PSA testing at this time.      reports that he has never smoked. He has never used smokeless tobacco.    Estimated body mass index is 24.62 kg/(m^2) as calculated from the following:    Height as of this encounter: 1.778 m (5' 10\").    Weight as of this encounter: 77.8 kg (171 lb 9.6 oz).       Counseling Resources:  ATP IV Guidelines  Pooled Cohorts Equation Calculator  FRAX Risk Assessment  ICSI Preventive Guidelines  Dietary Guidelines for Americans,   USDA's MyPlate  ASA Prophylaxis  Lung CA Screening    The information in this document, created by the medical scribe for me, accurately reflects the services I personally performed and the decisions made by me. I have reviewed and approved this document for accuracy prior to leaving the patient care area.  2018 9:10 " AM    Ferdinand Gutierrez MD  [facility]

## 2018-07-03 ENCOUNTER — OFFICE VISIT (OUTPATIENT)
Dept: CARDIOLOGY | Facility: CLINIC | Age: 58
End: 2018-07-03
Attending: FAMILY MEDICINE
Payer: COMMERCIAL

## 2018-07-03 VITALS
DIASTOLIC BLOOD PRESSURE: 70 MMHG | BODY MASS INDEX: 24.48 KG/M2 | WEIGHT: 171 LBS | SYSTOLIC BLOOD PRESSURE: 110 MMHG | HEIGHT: 70 IN | HEART RATE: 80 BPM

## 2018-07-03 DIAGNOSIS — E78.5 HYPERLIPIDEMIA LDL GOAL <130: ICD-10-CM

## 2018-07-03 DIAGNOSIS — E78.00 PURE HYPERCHOLESTEROLEMIA: Primary | ICD-10-CM

## 2018-07-03 PROCEDURE — 93000 ELECTROCARDIOGRAM COMPLETE: CPT | Performed by: INTERNAL MEDICINE

## 2018-07-03 PROCEDURE — 99203 OFFICE O/P NEW LOW 30 MIN: CPT | Mod: 25 | Performed by: INTERNAL MEDICINE

## 2018-07-03 NOTE — LETTER
7/3/2018    Ferdinand Gutierrez MD  [address]  [facility] [address]    RE: Noble Blanco       Dear Colleague,    I had the pleasure of seeing Noble Blanco in the [facility].    HPI and Plan:   See dictation    Orders Placed This Encounter   Procedures     CT Coronary Calcium Scan     EKG 12-lead complete w/read - Clinics (performed today)       No orders of the defined types were placed in this encounter.      Encounter Diagnoses   Name Primary?     Pure hypercholesterolemia Yes     Hyperlipidemia LDL goal <130        CURRENT MEDICATIONS:  Current Outpatient Prescriptions   Medication Sig Dispense Refill     Cholecalciferol (VITAMIN D) 1000 UNITS capsule Take 2,000 Units by mouth daily        Cyclobenzaprine HCl (FLEXERIL PO) Take 10 mg by mouth once as needed for muscle spasms       fexofenadine (ALLEGRA) 180 MG tablet Take by mouth daily       melatonin 3 MG tablet Take 1 tablet by mouth nightly as needed for sleep. 30 tablet 0     Multiple Vitamin (MULTIVITAMIN) per tablet Take 1 tablet by mouth daily. 100 tablet 12     Omega-3 Fatty Acids (SUPER OMEGA 3 EPA/DHA) 1000 MG CAPS Take  by mouth At Bedtime.         ALLERGIES     Allergies   Allergen Reactions     Atorvastatin      Crestor [Rosuvastatin] Muscle Pain (Myalgia)       PAST MEDICAL HISTORY:  Past Medical History:   Diagnosis Date     Allergic rhinitis 3/29/2011     Hyperlipidemia      Hyperlipidemia LDL goal <130 3/8/2017     Pure hypercholesterolemia 3/10/2017       PAST SURGICAL HISTORY:  Past Surgical History:   Procedure Laterality Date     APPENDECTOMY       C NONSPECIFIC PROCEDURE      S/P T&A (septal turbination repair)     C NONSPECIFIC PROCEDURE      S/P Vasectomy     C NONSPECIFIC PROCEDURE      Right foot fracture     C NONSPECIFIC PROCEDURE      L1-L2      COLONOSCOPY  9/21/2012    Procedure: COLONOSCOPY;  Colonoscopy  ;  Surgeon: Vahid Kuhn MD;  Location:  GI     ORTHOPEDIC SURGERY      rt  "shoulder surgery       FAMILY HISTORY:  Family History   Problem Relation Age of Onset     Hypertension Father      Lipids Father      Cancer Paternal Grandmother      intestinal cancer     Cerebrovascular Disease Paternal Grandfather 76     Coronary Artery Disease Mother 65     Stent     Family History Negative No family hx of        SOCIAL HISTORY:  Social History     Social History     Marital status:      Spouse name: N/A     Number of children: N/A     Years of education: N/A     Social History Main Topics     Smoking status: Never Smoker     Smokeless tobacco: Never Used     Alcohol use No     Drug use: No     Sexual activity: Yes     Partners: Female     Other Topics Concern     None     Social History Narrative       Review of Systems:  Skin:  Negative for     Eyes:  Positive for visual blurring  ENT:  Positive for hearing loss  Respiratory:  Positive for dyspnea on exertion  Cardiovascular:    chest pain;Positive for;lightheadedness  Gastroenterology: Negative for    Genitourinary:  Negative for    Musculoskeletal:  Positive for back pain  Neurologic:  Negative for    Psychiatric:  Negative for    Heme/Lymph/Imm:  Positive for allergies  Endocrine:  Negative      Physical Exam:  Vitals: /70  Pulse 80  Ht 1.778 m (5' 10\")  Wt 77.6 kg (171 lb)  BMI 24.54 kg/m2    Constitutional:  cooperative, alert and oriented, well developed, well nourished, in no acute distress        Skin:  warm and dry to the touch, no apparent skin lesions or masses noted          Head:  normocephalic, no masses or lesions        Eyes:  pupils equal and round, conjunctivae and lids unremarkable, sclera white, no xanthalasma, EOMS intact, no nystagmus        Lymph:No Cervical lymphadenopathy present     ENT:  no pallor or cyanosis, dentition good        Neck:  carotid pulses are full and equal bilaterally, JVP normal, no carotid bruit        Respiratory:  normal breath sounds, clear to auscultation, normal A-P diameter, " normal symmetry, normal respiratory excursion, no use of accessory muscles         Cardiac: regular rhythm, normal S1/S2, no S3 or S4, apical impulse not displaced, no murmurs, gallops or rubs                                                         GI:  abdomen soft, non-tender, BS normoactive, no mass, no HSM, no bruits        Extremities and Muscular Skeletal:  no deformities, clubbing, cyanosis, erythema observed              Neurological:  no gross motor deficits        Psych:  Alert and Oriented x 3        Recent Lab Results:  LIPID RESULTS:  Lab Results   Component Value Date    CHOL 312 (H) 06/06/2018    HDL 41 06/06/2018     (H) 06/06/2018    TRIG 200 (H) 06/06/2018    CHOLHDLRATIO 3.7 06/11/2015       LIVER ENZYME RESULTS:  Lab Results   Component Value Date    AST 15 06/06/2018    ALT 34 06/06/2018       CBC RESULTS:  Lab Results   Component Value Date    WBC 6.3 03/08/2017    RBC 5.62 03/08/2017    HGB 16.7 03/08/2017    HCT 49.8 03/08/2017    MCV 89 03/08/2017    MCH 29.7 03/08/2017    MCHC 33.5 03/08/2017    RDW 13.9 03/08/2017     03/08/2017       BMP RESULTS:  Lab Results   Component Value Date     06/06/2018    POTASSIUM 4.8 06/06/2018    CHLORIDE 106 06/06/2018    CO2 28 06/06/2018    ANIONGAP 8 06/06/2018    GLC 91 06/06/2018    BUN 13 06/06/2018    CR 1.03 06/06/2018    GFRESTIMATED 75 06/06/2018    GFRESTBLACK >90 06/06/2018    WILSON 9.6 06/06/2018        A1C RESULTS:  No results found for: A1C    INR RESULTS:  No results found for: INR        CC  Ferdinand Gutierrez MD  [address] [place] [address]                    Thank you for allowing me to participate in the care of your patient.      Sincerely,     DR LAUREN VILLATORO MD     [facility]    cc:   Ferdinand Gutierrez MD  [address] [place] [address]

## 2018-07-03 NOTE — LETTER
7/3/2018      Ferdinand Gutierrez MD  [address]  [facility] [address]      RE: Noble Wangne       Dear Colleague,    I had the pleasure of seeing Noble Blanco in the [facility].    Service Date: 07/03/2018      HISTORY OF PRESENT ILLNESS:  It is a pleasure for me to see Mr. Blanco today for evaluation of hypercholesterolemia.  I saw his most recent numbers, and he has a total cholesterol of 312, HDL 41, , triglycerides of 200.  I am very certain that this gentleman has familial hypercholesterolemia.  Indeed, he tells me that both his parents were on cholesterol-lowering medication and his mother had coronary artery disease and that she received angioplasty and stenting.      He tells me that in the mid 1990s he had a cholesterol level drawn and it was 250.  He was on a very strict diet at the time, and repeat cholesterol showed that the numbers actually went up to 306.  He was then put on Baycol.  This was subsequently switched to Zocor and something else, as all these medications made him have severe leg pains.  He was able to tolerate it.  Subsequently, he was switched to atorvastatin.  He had chest muscle pains and he felt extremely weak with terrific muscle tenderness and pain.  He tried adding saw palmetto as well as other supplements which did not help at all.  Finally, the atorvastatin was stopped and his muscle symptoms and chest pains all went away.  He exercises on a regular basis and has no exertional chest discomfort or any anginal-equivalent symptoms.  As a last resort, he was started on Crestor 5 mg once a week.  The cholesterol numbers were lower but not sufficiently low.  This was doubled to 5 mg twice a week, and unfortunately this reproduced all of the muscle aches and symptoms again, and he has since quit taking Crestor.  He does not have diabetes or hypertension.  He does not smoke, drink or abuse drugs.       PHYSICAL EXAMINATION:  Cardiac examination  is normal.  He has no cutaneous signs of hypercholesterolemia.      ASSESSMENT AND PLAN:  He would like to know whether he has any plaques building up in his heart, and for this purpose, I will request a coronary calcium score.  With an LDL of 231, I think it would be wise to start therapy for his hypercholesterolemia.  As he has a well-documented history of statin intolerance, he will be a good candidate for PCSK9 inhibitors.  He will think about this and will let me know how he would like to proceed.  Followup will be arranged once I have the results of his coronary CT calcium score as well as his decision on whether to start PCSK9 inhibitors.         LAUREN VILLATORO MD, [facility]             D: 2018   T: 2018   MT: MAURICE      Name:     WIL DAVID   MRN:      [id number]        Account:      [id number]   :      1960           Service Date: 2018      Document: [id number]           Outpatient Encounter Prescriptions as of 7/3/2018   Medication Sig Dispense Refill     Cholecalciferol (VITAMIN D) 1000 UNITS capsule Take 2,000 Units by mouth daily        Cyclobenzaprine HCl (FLEXERIL PO) Take 10 mg by mouth once as needed for muscle spasms       fexofenadine (ALLEGRA) 180 MG tablet Take by mouth daily       melatonin 3 MG tablet Take 1 tablet by mouth nightly as needed for sleep. 30 tablet 0     Multiple Vitamin (MULTIVITAMIN) per tablet Take 1 tablet by mouth daily. 100 tablet 12     Omega-3 Fatty Acids (SUPER OMEGA 3 EPA/DHA) 1000 MG CAPS Take  by mouth At Bedtime.       No facility-administered encounter medications on file as of 7/3/2018.        Again, thank you for allowing me to participate in the care of your patient.      Sincerely,    DR LAUREN VILLATORO MD     [facility]

## 2018-07-03 NOTE — MR AVS SNAPSHOT
After Visit Summary   7/3/2018    Noble Blanco    MRN: [id number]           Patient Information     Date Of Birth          1960        Visit Information        Provider Department      7/3/2018 2:45 PM Vaughn Whatley MD [facility]        Today's Diagnoses     Pure hypercholesterolemia    -  1    Hyperlipidemia LDL goal <130           Follow-ups after your visit        Your next 10 appointments already scheduled     Jul 16, 2018  8:00 AM CDT   CT CALCIUM SCREENING with SCICT1   [facility] (Cardiovascular Imaging at [facility])    [address] [place] [address] [address]  [facility] [id number] [phone]           It is best to avoid caffeine on the day of your test.  Be sure to tell your doctor:   If there s any chance you are pregnant.   If you have any special needs.  Please wear loose clothing, such as a sweat suit or jogging clothes. Avoid snaps, zippers and other metal. We may ask you to undress and put on a hospital gown.  If you have any questions, please call the Imaging Department where you will have your exam.              Future tests that were ordered for you today     Open Future Orders        Priority Expected Expires Ordered    CT Coronary Calcium Scan Routine 7/4/2018 7/3/2019 7/3/2018            Who to contact     If you have questions or need follow up information about today's clinic visit or your schedule please contact [facility] directly at [phone].  Normal or non-critical lab and imaging results will be communicated to you by MyChart, letter or phone within 4 business days after the clinic has received the results. If you do not hear from us within 7 days, please contact the clinic through MyChart or phone. If you have a critical or abnormal lab result, we will notify you by phone as soon as possible.  Submit refill requests through [employer] or  "call your pharmacy and they will forward the refill request to us. Please allow 3 business days for your refill to be completed.          Additional Information About Your Visit        MyChart Information     [employer] gives you secure access to your electronic health record. If you see a primary care provider, you can also send messages to your care team and make appointments. If you have questions, please call your primary care clinic.  If you do not have a primary care provider, please call [phone] and they will assist you.        Care EveryWhere ID     This is your Care EveryWhere ID. This could be used by other organizations to access your [place] medical records  [id number]        Your Vitals Were     Pulse Height BMI (Body Mass Index)             80 1.778 m (5' 10\") 24.54 kg/m2          Blood Pressure from Last 3 Encounters:   07/03/18 110/70   06/06/18 124/78   09/25/17 130/78    Weight from Last 3 Encounters:   07/03/18 77.6 kg (171 lb)   06/06/18 77.8 kg (171 lb 9.6 oz)   03/08/17 80.9 kg (178 lb 6.4 oz)              We Performed the Following     EKG 12-lead complete w/read - Clinics (performed today)        Primary Care Provider Office Phone # Fax #    Ferdinand Gutierrez [phone] [address] [address]        Equal Access to Services     CALIN LERNER : Hadii rakesh cameron hadasho Sobraydon, waaxda luqadaha, qaybta kaalmada celio, konrad suazo . So [facility] [phone].    ATENCIÓN: Si habla español, tiene a matamoros disposición servicios gratuitos de asistencia lingüística. Zak al [phone].    We comply with applicable federal civil rights laws and [place] laws. We do not discriminate on the basis of race, color, national origin, age, disability, sex, sexual orientation, or gender identity.            Thank you!     Thank you for choosing [facility]  for your care. Our goal is always to provide " you with excellent care. Hearing back from our patients is one way we can continue to improve our services. Please take a few minutes to complete the written survey that you may receive in the mail after your visit with us. Thank you!             Your Updated Medication List - Protect others around you: Learn how to safely use, store and throw away your medicines at www.disposemymeds.org.          This list is accurate as of 7/3/18  3:28 PM.  Always use your most recent med list.                   Brand Name Dispense Instructions for use Diagnosis    fexofenadine 180 MG tablet    ALLEGRA     Take by mouth daily        FLEXERIL PO      Take 10 mg by mouth once as needed for muscle spasms        melatonin 3 MG tablet     30 tablet    Take 1 tablet by mouth nightly as needed for sleep.        multivitamin per tablet     100 tablet    Take 1 tablet by mouth daily.        SUPER OMEGA 3 EPA/DHA 1000 MG Caps      Take  by mouth At Bedtime.        vitamin D 1000 units capsule      Take 2,000 Units by mouth daily

## 2018-07-03 NOTE — PROGRESS NOTES
HPI and Plan:   See dictation    Orders Placed This Encounter   Procedures     CT Coronary Calcium Scan     EKG 12-lead complete w/read - Clinics (performed today)       No orders of the defined types were placed in this encounter.      Encounter Diagnoses   Name Primary?     Pure hypercholesterolemia Yes     Hyperlipidemia LDL goal <130        CURRENT MEDICATIONS:  Current Outpatient Prescriptions   Medication Sig Dispense Refill     Cholecalciferol (VITAMIN D) 1000 UNITS capsule Take 2,000 Units by mouth daily        Cyclobenzaprine HCl (FLEXERIL PO) Take 10 mg by mouth once as needed for muscle spasms       fexofenadine (ALLEGRA) 180 MG tablet Take by mouth daily       melatonin 3 MG tablet Take 1 tablet by mouth nightly as needed for sleep. 30 tablet 0     Multiple Vitamin (MULTIVITAMIN) per tablet Take 1 tablet by mouth daily. 100 tablet 12     Omega-3 Fatty Acids (SUPER OMEGA 3 EPA/DHA) 1000 MG CAPS Take  by mouth At Bedtime.         ALLERGIES     Allergies   Allergen Reactions     Atorvastatin      Crestor [Rosuvastatin] Muscle Pain (Myalgia)       PAST MEDICAL HISTORY:  Past Medical History:   Diagnosis Date     Allergic rhinitis 3/29/2011     Hyperlipidemia      Hyperlipidemia LDL goal <130 3/8/2017     Pure hypercholesterolemia 3/10/2017       PAST SURGICAL HISTORY:  Past Surgical History:   Procedure Laterality Date     APPENDECTOMY       C NONSPECIFIC PROCEDURE      S/P T&A (septal turbination repair)     C NONSPECIFIC PROCEDURE      S/P Vasectomy     C NONSPECIFIC PROCEDURE      Right foot fracture     C NONSPECIFIC PROCEDURE      L1-L2      COLONOSCOPY  9/21/2012    Procedure: COLONOSCOPY;  Colonoscopy  ;  Surgeon: Vahid Kuhn MD;  Location:  GI     ORTHOPEDIC SURGERY      rt shoulder surgery       FAMILY HISTORY:  Family History   Problem Relation Age of Onset     Hypertension Father      Lipids Father      Cancer Paternal Grandmother      intestinal cancer     Cerebrovascular Disease  "Paternal Grandfather 76     Coronary Artery Disease Mother 65     Stent     Family History Negative No family hx of        SOCIAL HISTORY:  Social History     Social History     Marital status:      Spouse name: N/A     Number of children: N/A     Years of education: N/A     Social History Main Topics     Smoking status: Never Smoker     Smokeless tobacco: Never Used     Alcohol use No     Drug use: No     Sexual activity: Yes     Partners: Female     Other Topics Concern     None     Social History Narrative       Review of Systems:  Skin:  Negative for     Eyes:  Positive for visual blurring  ENT:  Positive for hearing loss  Respiratory:  Positive for dyspnea on exertion  Cardiovascular:    chest pain;Positive for;lightheadedness  Gastroenterology: Negative for    Genitourinary:  Negative for    Musculoskeletal:  Positive for back pain  Neurologic:  Negative for    Psychiatric:  Negative for    Heme/Lymph/Imm:  Positive for allergies  Endocrine:  Negative      Physical Exam:  Vitals: /70  Pulse 80  Ht 1.778 m (5' 10\")  Wt 77.6 kg (171 lb)  BMI 24.54 kg/m2    Constitutional:  cooperative, alert and oriented, well developed, well nourished, in no acute distress        Skin:  warm and dry to the touch, no apparent skin lesions or masses noted          Head:  normocephalic, no masses or lesions        Eyes:  pupils equal and round, conjunctivae and lids unremarkable, sclera white, no xanthalasma, EOMS intact, no nystagmus        Lymph:No Cervical lymphadenopathy present     ENT:  no pallor or cyanosis, dentition good        Neck:  carotid pulses are full and equal bilaterally, JVP normal, no carotid bruit        Respiratory:  normal breath sounds, clear to auscultation, normal A-P diameter, normal symmetry, normal respiratory excursion, no use of accessory muscles         Cardiac: regular rhythm, normal S1/S2, no S3 or S4, apical impulse not displaced, no murmurs, gallops or rubs                       "                                   GI:  abdomen soft, non-tender, BS normoactive, no mass, no HSM, no bruits        Extremities and Muscular Skeletal:  no deformities, clubbing, cyanosis, erythema observed              Neurological:  no gross motor deficits        Psych:  Alert and Oriented x 3        Recent Lab Results:  LIPID RESULTS:  Lab Results   Component Value Date    CHOL 312 (H) 06/06/2018    HDL 41 06/06/2018     (H) 06/06/2018    TRIG 200 (H) 06/06/2018    CHOLHDLRATIO 3.7 06/11/2015       LIVER ENZYME RESULTS:  Lab Results   Component Value Date    AST 15 06/06/2018    ALT 34 06/06/2018       CBC RESULTS:  Lab Results   Component Value Date    WBC 6.3 03/08/2017    RBC 5.62 03/08/2017    HGB 16.7 03/08/2017    HCT 49.8 03/08/2017    MCV 89 03/08/2017    MCH 29.7 03/08/2017    MCHC 33.5 03/08/2017    RDW 13.9 03/08/2017     03/08/2017       BMP RESULTS:  Lab Results   Component Value Date     06/06/2018    POTASSIUM 4.8 06/06/2018    CHLORIDE 106 06/06/2018    CO2 28 06/06/2018    ANIONGAP 8 06/06/2018    GLC 91 06/06/2018    BUN 13 06/06/2018    CR 1.03 06/06/2018    GFRESTIMATED 75 06/06/2018    GFRESTBLACK >90 06/06/2018    WILSON 9.6 06/06/2018        A1C RESULTS:  No results found for: A1C    INR RESULTS:  No results found for: INR        CC  Ferdinand Gutierrez MD  [address] [place] [address]

## 2018-07-04 NOTE — PROGRESS NOTES
Service Date: 07/03/2018      HISTORY OF PRESENT ILLNESS:  It is a pleasure for me to see Mr. Blanco today for evaluation of hypercholesterolemia.  I saw his most recent numbers, and he has a total cholesterol of 312, HDL 41, , triglycerides of 200.  I am very certain that this gentleman has familial hypercholesterolemia.  Indeed, he tells me that both his parents were on cholesterol-lowering medication and his mother had coronary artery disease and that she received angioplasty and stenting.      He tells me that in the mid 1990s he had a cholesterol level drawn and it was 250.  He was on a very strict diet at the time, and repeat cholesterol showed that the numbers actually went up to 306.  He was then put on Baycol.  This was subsequently switched to Zocor and something else, as all these medications made him have severe leg pains.  He was able to tolerate it.  Subsequently, he was switched to atorvastatin.  He had chest muscle pains and he felt extremely weak with terrific muscle tenderness and pain.  He tried adding saw palmetto as well as other supplements which did not help at all.  Finally, the atorvastatin was stopped and his muscle symptoms and chest pains all went away.  He exercises on a regular basis and has no exertional chest discomfort or any anginal-equivalent symptoms.  As a last resort, he was started on Crestor 5 mg once a week.  The cholesterol numbers were lower but not sufficiently low.  This was doubled to 5 mg twice a week, and unfortunately this reproduced all of the muscle aches and symptoms again, and he has since quit taking Crestor.  He does not have diabetes or hypertension.  He does not smoke, drink or abuse drugs.       PHYSICAL EXAMINATION:  Cardiac examination is normal.  He has no cutaneous signs of hypercholesterolemia.      ASSESSMENT AND PLAN:  He would like to know whether he has any plaques building up in his heart, and for this purpose, I will request a coronary  calcium score.  With an LDL of 231, I think it would be wise to start therapy for his hypercholesterolemia.  As he has a well-documented history of statin intolerance, he will be a good candidate for PCSK9 inhibitors.  He will think about this and will let me know how he would like to proceed.  Followup will be arranged once I have the results of his coronary CT calcium score as well as his decision on whether to start PCSK9 inhibitors.         LAUREN VILLATORO MD, FACC             D: 2018   T: 2018   MT: MAURICE      Name:     WIL DAVID   MRN:      [id number]        Account:      [id number]   :      1960           Service Date: 2018      Document: [id number]

## 2018-07-16 ENCOUNTER — HOSPITAL ENCOUNTER (OUTPATIENT)
Dept: CARDIOLOGY | Facility: CLINIC | Age: 58
Discharge: HOME | End: 2018-07-16
Attending: INTERNAL MEDICINE | Admitting: INTERNAL MEDICINE
Payer: COMMERCIAL

## 2018-07-16 ENCOUNTER — TELEPHONE (OUTPATIENT)
Dept: CARDIOLOGY | Facility: CLINIC | Age: 58
End: 2018-07-16

## 2018-07-16 DIAGNOSIS — E78.5 HYPERLIPIDEMIA LDL GOAL <130: ICD-10-CM

## 2018-07-16 DIAGNOSIS — E78.00 PURE HYPERCHOLESTEROLEMIA: ICD-10-CM

## 2018-07-16 PROCEDURE — 75571 CT HRT W/O DYE W/CA TEST: CPT

## 2018-07-16 PROCEDURE — 75571 CT HRT W/O DYE W/CA TEST: CPT | Mod: 26 | Performed by: INTERNAL MEDICINE

## 2018-07-16 NOTE — TELEPHONE ENCOUNTER
Ritika let him the zero calcium score.  Pls also tell him that I still think he should be on PCSK9 inhibitor.  Thanks.      Left message to return call for results of Calcium score pre Dr. VILLATORO

## 2018-07-17 DIAGNOSIS — E78.01 FAMILIAL HYPERCHOLESTEROLEMIA: Primary | ICD-10-CM

## 2018-07-17 NOTE — TELEPHONE ENCOUNTER
Patient returned call and is willing to start PCSK9 inhibitor.  He did see his Calcium Score in My Chart and does have a strong family hx of CAD.  Will forward to information to PCSK9 Coordinator.  Patient verbalizes understanding.

## 2018-07-18 ENCOUNTER — TELEPHONE (OUTPATIENT)
Dept: CARDIOLOGY | Facility: CLINIC | Age: 58
End: 2018-07-18

## 2018-07-18 NOTE — TELEPHONE ENCOUNTER
[facility] Prior Authorization Team   Phone: [phone]  Fax: [phone]    PA Initiation    Medication: Zackary MAYA Pending  Insurance Company: [employer] [facility] - Phone [phone] Fax [phone]  Pharmacy Filling the Rx: [facility] - [place], [place] - [address]  Filling Pharmacy Phone: [phone]  Filling Pharmacy Fax: [phone]  Start Date: 7/18/2018

## 2018-07-19 NOTE — TELEPHONE ENCOUNTER
[facility] Prior Authorization Team   Phone: [phone]  Fax: [phone]    PRIOR AUTHORIZATION DENIED    Medication: Zackary MAYA Denied    Denial Date: 7/19/2018    Denial Rational: The diagnosis of HeFH without provided documentation without the proper testing or a Cypriot lipid score greater than or equal to 8, or a Christopher-[place]co diagnostic of definite does not establish medical necessity for this drug.    Appeal Information: In order for plan to reconsider a letter of medical necessity a written request must be sent by the member within 6 months from the date of this letter.

## 2018-07-24 ENCOUNTER — TELEPHONE (OUTPATIENT)
Dept: CARDIOLOGY | Facility: CLINIC | Age: 58
End: 2018-07-24

## 2018-07-31 NOTE — PROGRESS NOTES
Pt was denied Repatha because he did not meet the criteria for RN spoke at length with patient regarding possible familial hypercholesterolemia. Pt reports the following history of family members.  Pt reported his first cholesterol level at age 34 was 306. His father's cholesterol level was over 300-he  of an non cardiac event. It is believed his grandfather had an MI at the age of around 50. His two uncles had elevated cholesterol in the 300 range. His mother had a stent placed at 64 years and his sister had an elevated cholesterol of > 200 at a young age and at 60 her cholesterol is >300 (cholesterol medications have not helped her levels). His son has at age 22 has an LDL of 217.

## 2018-09-10 ENCOUNTER — TELEPHONE (OUTPATIENT)
Dept: CARDIOLOGY | Facility: CLINIC | Age: 58
End: 2018-09-10

## 2018-09-10 NOTE — TELEPHONE ENCOUNTER
Wondering where the appeal for Repatha approval is at.  Will message Maria Fernanda, nurse working on getting Repatha approved, to give him a call back at [phone].  Verbalized understanding.

## 2018-09-11 NOTE — TELEPHONE ENCOUNTER
RN called pt and left a VM regarding the [employer] appeal process. Per [employer] the appeal process takes up to 60 days. RN left this information in a voicemail message. Pt to call with any further questions.

## 2018-09-19 ENCOUNTER — OFFICE VISIT (OUTPATIENT)
Dept: FAMILY MEDICINE | Facility: CLINIC | Age: 58
End: 2018-09-19
Payer: COMMERCIAL

## 2018-09-19 VITALS
HEART RATE: 81 BPM | SYSTOLIC BLOOD PRESSURE: 112 MMHG | HEIGHT: 70 IN | DIASTOLIC BLOOD PRESSURE: 82 MMHG | BODY MASS INDEX: 24.48 KG/M2 | TEMPERATURE: 98.2 F | OXYGEN SATURATION: 98 % | WEIGHT: 171 LBS

## 2018-09-19 DIAGNOSIS — E78.00 PURE HYPERCHOLESTEROLEMIA: ICD-10-CM

## 2018-09-19 DIAGNOSIS — N52.9 ERECTILE DYSFUNCTION, UNSPECIFIED ERECTILE DYSFUNCTION TYPE: Primary | ICD-10-CM

## 2018-09-19 LAB
ERYTHROCYTE [DISTWIDTH] IN BLOOD BY AUTOMATED COUNT: 13.6 % (ref 10–15)
HCT VFR BLD AUTO: 50.4 % (ref 40–53)
HGB BLD-MCNC: 16.5 G/DL (ref 13.3–17.7)
MCH RBC QN AUTO: 29.7 PG (ref 26.5–33)
MCHC RBC AUTO-ENTMCNC: 32.7 G/DL (ref 31.5–36.5)
MCV RBC AUTO: 91 FL (ref 78–100)
PLATELET # BLD AUTO: 239 10E9/L (ref 150–450)
RBC # BLD AUTO: 5.55 10E12/L (ref 4.4–5.9)
WBC # BLD AUTO: 8.2 10E9/L (ref 4–11)

## 2018-09-19 PROCEDURE — 80061 LIPID PANEL: CPT | Performed by: FAMILY MEDICINE

## 2018-09-19 PROCEDURE — 36415 COLL VENOUS BLD VENIPUNCTURE: CPT | Performed by: FAMILY MEDICINE

## 2018-09-19 PROCEDURE — 85027 COMPLETE CBC AUTOMATED: CPT | Performed by: FAMILY MEDICINE

## 2018-09-19 PROCEDURE — 84403 ASSAY OF TOTAL TESTOSTERONE: CPT | Performed by: FAMILY MEDICINE

## 2018-09-19 PROCEDURE — 99213 OFFICE O/P EST LOW 20 MIN: CPT | Performed by: FAMILY MEDICINE

## 2018-09-19 RX ORDER — SILDENAFIL CITRATE 20 MG/1
TABLET ORAL
Qty: 30 TABLET | Refills: 0 | Status: SHIPPED | OUTPATIENT
Start: 2018-09-19

## 2018-09-19 NOTE — MR AVS SNAPSHOT
"              After Visit Summary   9/19/2018    Noble Blanco    MRN: [id number]           Patient Information     Date Of Birth          1960        Visit Information        Provider Department      9/19/2018 7:00 AM Ferdinand Gutierrez MD [facility]        Today's Diagnoses     Erectile dysfunction, unspecified erectile dysfunction type    -  1    Pure hypercholesterolemia           Follow-ups after your visit        Who to contact     If you have questions or need follow up information about today's clinic visit or your schedule please contact [facility] directly at [phone].  Normal or non-critical lab and imaging results will be communicated to you by [facility], letter or phone within 4 business days after the clinic has received the results. If you do not hear from us within 7 days, please contact the clinic through [facility] or phone. If you have a critical or abnormal lab result, we will notify you by phone as soon as possible.  Submit refill requests through [employer] or call your pharmacy and they will forward the refill request to us. Please allow 3 business days for your refill to be completed.          Additional Information About Your Visit        MyChart Information     [employer] gives you secure access to your electronic health record. If you see a primary care provider, you can also send messages to your care team and make appointments. If you have questions, please call your primary care clinic.  If you do not have a primary care provider, please call [phone] and they will assist you.        Care EveryWhere ID     This is your Care EveryWhere ID. This could be used by other organizations to access your [place] medical records  [id number]        Your Vitals Were     Pulse Temperature Height Pulse Oximetry BMI (Body Mass Index)       81 98.2  F (36.8  C) (Oral) 5' 10\" (1.778 m) 98% 24.54 kg/m2        Blood Pressure from Last 3 Encounters:   09/19/18 112/82 "   07/03/18 110/70   06/06/18 124/78    Weight from Last 3 Encounters:   09/19/18 171 lb (77.6 kg)   07/03/18 171 lb (77.6 kg)   06/06/18 171 lb 9.6 oz (77.8 kg)              We Performed the Following     CBC with platelets     Lipid panel reflex to direct LDL Fasting     Testosterone, total          Today's Medication Changes          These changes are accurate as of 9/19/18  7:27 AM.  If you have any questions, ask your nurse or doctor.               Start taking these medicines.        Dose/Directions    sildenafil 20 MG tablet   Commonly known as:  REVATIO   Used for:  Erectile dysfunction, unspecified erectile dysfunction type   Started by:  Ferdinand Gutierrez MD        Take 2-3 tablet (40-60 mg) daily as needed.  Never use with nitroglycerin, terazosin or doxazosin.   Quantity:  30 tablet   Refills:  0         Stop taking these medicines if you haven't already. Please contact your care team if you have questions.     multivitamin per tablet   Stopped by:  Ferdinand Gutierrez MD                Where to get your medicines      Some of these will need a paper prescription and others can be bought over the counter.  Ask your nurse if you have questions.     Bring a paper prescription for each of these medications     sildenafil 20 MG tablet                Primary Care Provider Office Phone # Fax #    Ferdinand Gutierrez [phone] [address] [facility] [address]        Equal Access to Services     CALIN LERNER : Aliya levy Sobraydon, waaxda luqadaha, qaybta kaalkonrad del valle. So [place] [phone].    ATENCIÓN: Si habla español, tiene a matamoros disposición servicios gratuitos de asistencia lingüística. Llcathie al [phone].    We comply with applicable federal civil rights laws and [place] laws. We do not discriminate on the basis of race, color, national origin, age, disability, sex, sexual orientation, or gender identity.             Thank you!     Thank you for choosing [facility]  for your care. Our goal is always to provide you with excellent care. Hearing back from our patients is one way we can continue to improve our services. Please take a few minutes to complete the written survey that you may receive in the mail after your visit with us. Thank you!             Your Updated Medication List - Protect others around you: Learn how to safely use, store and throw away your medicines at www.disposemymeds.org.          This list is accurate as of 9/19/18  7:27 AM.  Always use your most recent med list.                   Brand Name Dispense Instructions for use Diagnosis    evolocumab 140 MG/ML prefilled autoinjector    REPATHA    2 mL    Inject 1 mL (140 mg) Subcutaneous every 14 days    Familial hypercholesterolemia       fexofenadine 180 MG tablet    ALLEGRA     Take by mouth daily        FLEXERIL PO      Take 10 mg by mouth once as needed for muscle spasms        melatonin 3 MG tablet     30 tablet    Take 1 tablet by mouth nightly as needed for sleep.        sildenafil 20 MG tablet    REVATIO    30 tablet    Take 2-3 tablet (40-60 mg) daily as needed.  Never use with nitroglycerin, terazosin or doxazosin.    Erectile dysfunction, unspecified erectile dysfunction type       SUPER OMEGA 3 EPA/DHA 1000 MG Caps      Take  by mouth At Bedtime.        vitamin D 1000 units capsule      Take 2,000 Units by mouth daily

## 2018-09-19 NOTE — PROGRESS NOTES
SUBJECTIVE:   Noble Blanco is a 57 year old male who presents to clinic today for the following health issues:    Patient reports erectile dysfunction for the past few years. He has difficulties getting and maintaining an erection. Patient reports decreased libido as well.     Patient with history of hypercholesterolemia. He has questions about taking evolocumab as he had adverse effects to atorvastatin and Crestor. Followed by cardiology.     Problem list and histories reviewed & adjusted, as indicated.  Additional history: as documented    Patient Active Problem List   Diagnosis     Allergic rhinitis     Hyperlipidemia LDL goal <130     Pure hypercholesterolemia     Past Surgical History:   Procedure Laterality Date     APPENDECTOMY       C NONSPECIFIC PROCEDURE      S/P T&A (septal turbination repair)     C NONSPECIFIC PROCEDURE      S/P Vasectomy     C NONSPECIFIC PROCEDURE      Right foot fracture     C NONSPECIFIC PROCEDURE      L1-L2      COLONOSCOPY  9/21/2012    Procedure: COLONOSCOPY;  Colonoscopy  ;  Surgeon: Vahid Kuhn MD;  Location:  GI     ORTHOPEDIC SURGERY      rt shoulder surgery       Social History   Substance Use Topics     Smoking status: Never Smoker     Smokeless tobacco: Never Used     Alcohol use No     Family History   Problem Relation Age of Onset     Hypertension Father      Lipids Father      Cancer Paternal Grandmother      intestinal cancer     Cerebrovascular Disease Paternal Grandfather 76     Coronary Artery Disease Mother 65     Stent     Family History Negative No family hx of            Reviewed and updated as needed this visit by clinical staff  Tobacco  Allergies  Meds  Med Hx  Surg Hx  Fam Hx  Soc Hx      Reviewed and updated as needed this visit by Provider         ROS:  : positive for erectile dysfunction, as noted above   PSYCHIATRIC: POSITIVE for sexual difficulties-libido, as noted above     This document serves as a record of the services and  "decisions personally performed and made by Ferdinand Gutierrez MD. It was created on his behalf by Tata Palomino, a trained medical scribe. The creation of this document is based on the provider's statements to the medical scribe.  Tata Palomino 7:09 AM September 19, 2018    OBJECTIVE:     /82 (BP Location: Right arm, Patient Position: Chair, Cuff Size: Adult Regular)  Pulse 81  Temp 98.2  F (36.8  C) (Oral)  Ht 1.778 m (5' 10\")  Wt 77.6 kg (171 lb)  SpO2 98%  BMI 24.54 kg/m2  Body mass index is 24.54 kg/(m^2).  GENERAL: healthy, alert and no distress    Diagnostic Test Results:  No results found for this or any previous visit (from the past 24 hour(s)).    ASSESSMENT/PLAN:     1. Erectile dysfunction, unspecified erectile dysfunction type   Try sildenafil at 20 mg each encounter initially with increase to 40-60 mg if not effective.  Discussed side effects and to go to ER if having vision or hearing change or erection lasting longer than 4 hours.  Discussed no use with nitroglycerin and associated risk.  - Testosterone, total  - CBC with platelets  - sildenafil (REVATIO) 20 MG tablet; Take 2-3 tablet (40-60 mg) daily as needed.  Never use with nitroglycerin, terazosin or doxazosin.  Dispense: 30 tablet; Refill: 0    2. Pure hypercholesterolemia  - Lipid panel reflex to direct LDL Fasting    The information in this document, created by the medical scribe for me, accurately reflects the services I personally performed and the decisions made by me. I have reviewed and approved this document for accuracy prior to leaving the patient care area.  September 19, 2018 7:28 AM    Ferdinand Gutierrez MD  [facility]    "

## 2018-09-20 LAB
CHOLEST SERPL-MCNC: 322 MG/DL
HDLC SERPL-MCNC: 36 MG/DL
LDLC SERPL CALC-MCNC: 227 MG/DL
NONHDLC SERPL-MCNC: 286 MG/DL
TRIGL SERPL-MCNC: 296 MG/DL

## 2018-09-21 LAB — TESTOST SERPL-MCNC: 454 NG/DL (ref 240–950)

## 2018-10-30 ENCOUNTER — TELEPHONE (OUTPATIENT)
Dept: CARDIOLOGY | Facility: CLINIC | Age: 58
End: 2018-10-30

## 2018-10-30 NOTE — TELEPHONE ENCOUNTER
RN called pt to discuss the appeal for Repatha. Pt states he is moving to [place]. RN advised pt to establish care in [place] and perhaps restart the process there for Repatha. Pt to contact RN if he would like information on his appeal. RN will send the documents to him.

## 2020-01-30 ENCOUNTER — APPOINTMENT (RX ONLY)
Dept: RURAL CLINIC 4 | Facility: CLINIC | Age: 60
Setting detail: DERMATOLOGY
End: 2020-01-30

## 2020-01-30 DIAGNOSIS — D18.0 HEMANGIOMA: ICD-10-CM

## 2020-01-30 DIAGNOSIS — D485 NEOPLASM OF UNCERTAIN BEHAVIOR OF SKIN: ICD-10-CM

## 2020-01-30 DIAGNOSIS — L82.1 OTHER SEBORRHEIC KERATOSIS: ICD-10-CM

## 2020-01-30 DIAGNOSIS — L57.8 OTHER SKIN CHANGES DUE TO CHRONIC EXPOSURE TO NONIONIZING RADIATION: ICD-10-CM

## 2020-01-30 DIAGNOSIS — D22 MELANOCYTIC NEVI: ICD-10-CM

## 2020-01-30 DIAGNOSIS — L85.3 XEROSIS CUTIS: ICD-10-CM

## 2020-01-30 PROBLEM — D22.5 MELANOCYTIC NEVI OF TRUNK: Status: ACTIVE | Noted: 2020-01-30

## 2020-01-30 PROBLEM — D18.01 HEMANGIOMA OF SKIN AND SUBCUTANEOUS TISSUE: Status: ACTIVE | Noted: 2020-01-30

## 2020-01-30 PROBLEM — D48.5 NEOPLASM OF UNCERTAIN BEHAVIOR OF SKIN: Status: ACTIVE | Noted: 2020-01-30

## 2020-01-30 PROCEDURE — 99203 OFFICE O/P NEW LOW 30 MIN: CPT

## 2020-01-30 PROCEDURE — ? OBSERVATION

## 2020-01-30 PROCEDURE — ? COUNSELING

## 2020-01-30 ASSESSMENT — LOCATION DETAILED DESCRIPTION DERM
LOCATION DETAILED: RIGHT INFERIOR UPPER BACK
LOCATION DETAILED: SUPERIOR THORACIC SPINE
LOCATION DETAILED: RIGHT INFERIOR ANTERIOR NECK
LOCATION DETAILED: EPIGASTRIC SKIN
LOCATION DETAILED: LEFT INFERIOR UPPER BACK
LOCATION DETAILED: RIGHT LATERAL ABDOMEN

## 2020-01-30 ASSESSMENT — LOCATION ZONE DERM
LOCATION ZONE: TRUNK
LOCATION ZONE: NECK

## 2020-01-30 ASSESSMENT — LOCATION SIMPLE DESCRIPTION DERM
LOCATION SIMPLE: RIGHT UPPER BACK
LOCATION SIMPLE: RIGHT ANTERIOR NECK
LOCATION SIMPLE: ABDOMEN
LOCATION SIMPLE: UPPER BACK
LOCATION SIMPLE: LEFT UPPER BACK

## 2020-01-30 NOTE — PROCEDURE: MIPS QUALITY
Additional Notes: Pt. Not of age for pneumonia vaccine
Detail Level: Detailed
Quality 111:Pneumonia Vaccination Status For Older Adults: Pneumococcal Vaccination not Administered or Previously Received, Reason not Otherwise Specified
Quality 130: Documentation Of Current Medications In The Medical Record: Current Medications Documented

## 2020-02-10 ENCOUNTER — HEALTH MAINTENANCE LETTER (OUTPATIENT)
Age: 60
End: 2020-02-10

## 2020-11-16 ENCOUNTER — HEALTH MAINTENANCE LETTER (OUTPATIENT)
Age: 60
End: 2020-11-16

## 2021-02-18 ENCOUNTER — APPOINTMENT (RX ONLY)
Dept: RURAL CLINIC 4 | Facility: CLINIC | Age: 61
Setting detail: DERMATOLOGY
End: 2021-02-18

## 2021-02-18 VITALS — TEMPERATURE: 97.9 F

## 2021-02-18 DIAGNOSIS — D22 MELANOCYTIC NEVI: ICD-10-CM

## 2021-02-18 DIAGNOSIS — L738 OTHER SPECIFIED DISEASES OF HAIR AND HAIR FOLLICLES: ICD-10-CM

## 2021-02-18 DIAGNOSIS — L82.1 OTHER SEBORRHEIC KERATOSIS: ICD-10-CM

## 2021-02-18 DIAGNOSIS — L73.9 FOLLICULAR DISORDER, UNSPECIFIED: ICD-10-CM

## 2021-02-18 DIAGNOSIS — L663 OTHER SPECIFIED DISEASES OF HAIR AND HAIR FOLLICLES: ICD-10-CM

## 2021-02-18 DIAGNOSIS — L57.8 OTHER SKIN CHANGES DUE TO CHRONIC EXPOSURE TO NONIONIZING RADIATION: ICD-10-CM

## 2021-02-18 DIAGNOSIS — D18.0 HEMANGIOMA: ICD-10-CM

## 2021-02-18 DIAGNOSIS — L21.8 OTHER SEBORRHEIC DERMATITIS: ICD-10-CM

## 2021-02-18 PROBLEM — L02.92 FURUNCLE, UNSPECIFIED: Status: ACTIVE | Noted: 2021-02-18

## 2021-02-18 PROBLEM — D22.5 MELANOCYTIC NEVI OF TRUNK: Status: ACTIVE | Noted: 2021-02-18

## 2021-02-18 PROBLEM — D18.01 HEMANGIOMA OF SKIN AND SUBCUTANEOUS TISSUE: Status: ACTIVE | Noted: 2021-02-18

## 2021-02-18 PROCEDURE — ? COUNSELING

## 2021-02-18 PROCEDURE — 99213 OFFICE O/P EST LOW 20 MIN: CPT

## 2021-02-18 PROCEDURE — ? DEFER

## 2021-02-18 ASSESSMENT — LOCATION DETAILED DESCRIPTION DERM
LOCATION DETAILED: EPIGASTRIC SKIN
LOCATION DETAILED: RIGHT SUPERIOR MEDIAL UPPER BACK
LOCATION DETAILED: RIGHT LATERAL FOREHEAD
LOCATION DETAILED: HAIR
LOCATION DETAILED: LEFT LATERAL FOREHEAD
LOCATION DETAILED: PERIUMBILICAL SKIN

## 2021-02-18 ASSESSMENT — LOCATION ZONE DERM
LOCATION ZONE: FACE
LOCATION ZONE: SCALP
LOCATION ZONE: TRUNK

## 2021-02-18 ASSESSMENT — LOCATION SIMPLE DESCRIPTION DERM
LOCATION SIMPLE: RIGHT UPPER BACK
LOCATION SIMPLE: LEFT FOREHEAD
LOCATION SIMPLE: HAIR
LOCATION SIMPLE: RIGHT FOREHEAD
LOCATION SIMPLE: ABDOMEN

## 2021-02-18 NOTE — PROCEDURE: MIPS QUALITY
Additional Notes: Patient does not take any medications. \\nPatient not of age for pneumonia vaccine.
Detail Level: Detailed
Quality 111:Pneumonia Vaccination Status For Older Adults: Pneumococcal Vaccination not Administered or Previously Received, Reason not Otherwise Specified
Quality 130: Documentation Of Current Medications In The Medical Record: Eligible clinician attests to documenting in the medical record the patient is not eligible for a current list of medications being obtained, updated, or reviewed by the eligible clinician

## 2021-02-18 NOTE — PROCEDURE: DEFER
Instructions (Optional): ketoconazole shampoo medication
Introduction Text (Please End With A Colon): The following procedure was deferred:
Detail Level: Simple

## 2021-04-04 ENCOUNTER — HEALTH MAINTENANCE LETTER (OUTPATIENT)
Age: 61
End: 2021-04-04

## 2021-09-18 ENCOUNTER — HEALTH MAINTENANCE LETTER (OUTPATIENT)
Age: 61
End: 2021-09-18

## 2022-02-10 ENCOUNTER — APPOINTMENT (RX ONLY)
Dept: RURAL CLINIC 4 | Facility: CLINIC | Age: 62
Setting detail: DERMATOLOGY
End: 2022-02-10

## 2022-02-10 DIAGNOSIS — L85.3 XEROSIS CUTIS: ICD-10-CM

## 2022-02-10 DIAGNOSIS — D18.0 HEMANGIOMA: ICD-10-CM

## 2022-02-10 DIAGNOSIS — D17 BENIGN LIPOMATOUS NEOPLASM: ICD-10-CM

## 2022-02-10 DIAGNOSIS — L57.8 OTHER SKIN CHANGES DUE TO CHRONIC EXPOSURE TO NONIONIZING RADIATION: ICD-10-CM

## 2022-02-10 DIAGNOSIS — D22 MELANOCYTIC NEVI: ICD-10-CM

## 2022-02-10 DIAGNOSIS — L82.1 OTHER SEBORRHEIC KERATOSIS: ICD-10-CM

## 2022-02-10 PROBLEM — D17.1 BENIGN LIPOMATOUS NEOPLASM OF SKIN AND SUBCUTANEOUS TISSUE OF TRUNK: Status: ACTIVE | Noted: 2022-02-10

## 2022-02-10 PROBLEM — D22.5 MELANOCYTIC NEVI OF TRUNK: Status: ACTIVE | Noted: 2022-02-10

## 2022-02-10 PROBLEM — D18.01 HEMANGIOMA OF SKIN AND SUBCUTANEOUS TISSUE: Status: ACTIVE | Noted: 2022-02-10

## 2022-02-10 PROCEDURE — 99213 OFFICE O/P EST LOW 20 MIN: CPT

## 2022-02-10 PROCEDURE — ? COUNSELING

## 2022-02-10 ASSESSMENT — LOCATION SIMPLE DESCRIPTION DERM
LOCATION SIMPLE: LEFT UPPER BACK
LOCATION SIMPLE: ABDOMEN

## 2022-02-10 ASSESSMENT — LOCATION DETAILED DESCRIPTION DERM
LOCATION DETAILED: EPIGASTRIC SKIN
LOCATION DETAILED: PERIUMBILICAL SKIN
LOCATION DETAILED: LEFT INFERIOR MEDIAL UPPER BACK

## 2022-02-10 ASSESSMENT — LOCATION ZONE DERM: LOCATION ZONE: TRUNK

## 2022-02-10 NOTE — HPI: FULL BODY SKIN EXAMINATION
How Severe Are Your Spot(S)?: moderate
What Is The Reason For Today's Visit?: Annual Full Body Skin Examination with No Concerns
100% of the time

## 2022-04-30 ENCOUNTER — HEALTH MAINTENANCE LETTER (OUTPATIENT)
Age: 62
End: 2022-04-30

## 2022-11-20 ENCOUNTER — HEALTH MAINTENANCE LETTER (OUTPATIENT)
Age: 62
End: 2022-11-20

## 2023-03-02 ENCOUNTER — APPOINTMENT (RX ONLY)
Dept: RURAL CLINIC 4 | Facility: CLINIC | Age: 63
Setting detail: DERMATOLOGY
End: 2023-03-02

## 2023-03-02 DIAGNOSIS — L82.1 OTHER SEBORRHEIC KERATOSIS: ICD-10-CM

## 2023-03-02 DIAGNOSIS — L81.4 OTHER MELANIN HYPERPIGMENTATION: ICD-10-CM

## 2023-03-02 DIAGNOSIS — B36.0 PITYRIASIS VERSICOLOR: ICD-10-CM

## 2023-03-02 DIAGNOSIS — D18.0 HEMANGIOMA: ICD-10-CM

## 2023-03-02 DIAGNOSIS — D17 BENIGN LIPOMATOUS NEOPLASM: ICD-10-CM

## 2023-03-02 DIAGNOSIS — L85.3 XEROSIS CUTIS: ICD-10-CM

## 2023-03-02 DIAGNOSIS — D22 MELANOCYTIC NEVI: ICD-10-CM

## 2023-03-02 DIAGNOSIS — L57.8 OTHER SKIN CHANGES DUE TO CHRONIC EXPOSURE TO NONIONIZING RADIATION: ICD-10-CM

## 2023-03-02 PROBLEM — D17.1 BENIGN LIPOMATOUS NEOPLASM OF SKIN AND SUBCUTANEOUS TISSUE OF TRUNK: Status: ACTIVE | Noted: 2023-03-02

## 2023-03-02 PROBLEM — D18.01 HEMANGIOMA OF SKIN AND SUBCUTANEOUS TISSUE: Status: ACTIVE | Noted: 2023-03-02

## 2023-03-02 PROBLEM — D22.5 MELANOCYTIC NEVI OF TRUNK: Status: ACTIVE | Noted: 2023-03-02

## 2023-03-02 PROBLEM — L08.9 LOCAL INFECTION OF THE SKIN AND SUBCUTANEOUS TISSUE, UNSPECIFIED: Status: ACTIVE | Noted: 2023-03-02

## 2023-03-02 PROCEDURE — ? ORDER TESTS

## 2023-03-02 PROCEDURE — ? PRESCRIPTION MEDICATION MANAGEMENT

## 2023-03-02 PROCEDURE — ? COUNSELING

## 2023-03-02 PROCEDURE — 11103 TANGNTL BX SKIN EA SEP/ADDL: CPT

## 2023-03-02 PROCEDURE — ? BIOPSY BY SHAVE METHOD

## 2023-03-02 PROCEDURE — 99214 OFFICE O/P EST MOD 30 MIN: CPT | Mod: 25

## 2023-03-02 PROCEDURE — ? SUNSCREEN RECOMMENDATIONS

## 2023-03-02 PROCEDURE — ? ADDITIONAL NOTES

## 2023-03-02 PROCEDURE — 11102 TANGNTL BX SKIN SINGLE LES: CPT

## 2023-03-02 ASSESSMENT — LOCATION ZONE DERM
LOCATION ZONE: ARM
LOCATION ZONE: FACE
LOCATION ZONE: TRUNK

## 2023-03-02 ASSESSMENT — LOCATION DETAILED DESCRIPTION DERM
LOCATION DETAILED: LEFT INFERIOR CENTRAL MALAR CHEEK
LOCATION DETAILED: RIGHT INFERIOR LATERAL UPPER BACK
LOCATION DETAILED: INFERIOR THORACIC SPINE
LOCATION DETAILED: SUPERIOR THORACIC SPINE
LOCATION DETAILED: LEFT SUPERIOR LATERAL UPPER BACK
LOCATION DETAILED: RIGHT ANTERIOR DISTAL UPPER ARM
LOCATION DETAILED: STERNUM
LOCATION DETAILED: SUPERIOR LUMBAR SPINE
LOCATION DETAILED: LEFT ANTERIOR DISTAL UPPER ARM
LOCATION DETAILED: EPIGASTRIC SKIN
LOCATION DETAILED: RIGHT SUPERIOR UPPER BACK
LOCATION DETAILED: MIDDLE STERNUM

## 2023-03-02 ASSESSMENT — LOCATION SIMPLE DESCRIPTION DERM
LOCATION SIMPLE: LEFT CHEEK
LOCATION SIMPLE: LEFT UPPER ARM
LOCATION SIMPLE: LEFT UPPER BACK
LOCATION SIMPLE: RIGHT UPPER BACK
LOCATION SIMPLE: RIGHT UPPER ARM
LOCATION SIMPLE: UPPER BACK
LOCATION SIMPLE: CHEST
LOCATION SIMPLE: ABDOMEN
LOCATION SIMPLE: LOWER BACK

## 2023-03-02 NOTE — PROCEDURE: BIOPSY BY SHAVE METHOD
Detail Level: Detailed
Depth Of Biopsy: dermis
Was A Bandage Applied: Yes
Size Of Lesion In Cm: 0
Biopsy Type: H and E
Biopsy Method: Dermablade
Anesthesia Type: 1% lidocaine with 1:200,000 epinephrine
Anesthesia Volume In Cc (Will Not Render If 0): 1
Hemostasis: Drysol
Wound Care: Petrolatum
Dressing: pressure dressing with telfa
Destruction After The Procedure: No
Type Of Destruction Used: Curettage
Curettage Text: The wound bed was treated with curettage after the biopsy was performed.
Cryotherapy Text: The wound bed was treated with cryotherapy after the biopsy was performed.
Electrodesiccation Text: The wound bed was treated with electrodesiccation after the biopsy was performed.
Electrodesiccation And Curettage Text: The wound bed was treated with electrodesiccation and curettage after the biopsy was performed.
Silver Nitrate Text: The wound bed was treated with silver nitrate after the biopsy was performed.
Lab: [facility]
Lab Facility: [id number] [place]
Consent: The provider?s intent is to obtain a tissue sample solely for diagnostic purposes. Written consent was obtained and risks were reviewed including but not limited to scarring, infection, bleeding, scabbing, incomplete removal, nerve damage and allergy to anesthesia.
Post-Care Instructions: I reviewed with the patient in detail post-care instructions. Patient is to keep the biopsy site dry overnight, and then apply mupirocin twice daily until healed. Patient may apply aveeno soaks to remove any crusting.
Notification Instructions: Patient will be notified of biopsy results. However, patient instructed to call the office if not contacted within 2 weeks.
Billing Type: [employer]
Information: Selecting Yes will display possible errors in your note based on the variables you have selected. This validation is only offered as a suggestion for you. PLEASE NOTE THAT THE VALIDATION TEXT WILL BE REMOVED WHEN YOU FINALIZE YOUR NOTE. IF YOU WANT TO FAX A PRELIMINARY NOTE YOU WILL NEED TO TOGGLE THIS TO 'NO' IF YOU DO NOT WANT IT IN YOUR FAXED NOTE.
Lab: [id number]
Lab Facility: [id number]
Billing Type: Third-Party Bill

## 2023-03-02 NOTE — PROCEDURE: ORDER TESTS
Billing Type: [employer]
Bill For Surgical Tray: no
Performing Laboratory: -[id number]
Expected Date Of Service: 03/02/2023

## 2023-03-02 NOTE — PROCEDURE: ADDITIONAL NOTES
Detail Level: Detailed
Render Risk Assessment In Note?: no
Additional Notes: Patient will need to see a general surgeon for surgical removal

## 2023-03-02 NOTE — PROCEDURE: PRESCRIPTION MEDICATION MANAGEMENT
Detail Level: Detailed
Defer Treatment (Provide Reason For Deferment In Text Field Below): Ciclopriox patient states wonât apply cream as directed
Render In Strict Bullet Format?: No

## 2023-04-25 ENCOUNTER — APPOINTMENT (RX ONLY)
Dept: RURAL CLINIC 4 | Facility: CLINIC | Age: 63
Setting detail: DERMATOLOGY
End: 2023-04-25

## 2023-04-25 DIAGNOSIS — Z79.899 OTHER LONG TERM (CURRENT) DRUG THERAPY: ICD-10-CM

## 2023-04-25 PROCEDURE — ? ORDER TESTS

## 2023-04-25 NOTE — PROCEDURE: ORDER TESTS
Bill For Surgical Tray: no
Billing Type: [employer]
Expected Date Of Service: 04/25/2023
Performing Laboratory: -[id number]
Lab Facility: [id number]

## 2023-05-19 ENCOUNTER — APPOINTMENT (RX ONLY)
Dept: RURAL CLINIC 4 | Facility: CLINIC | Age: 63
Setting detail: DERMATOLOGY
End: 2023-05-19

## 2023-05-19 DIAGNOSIS — L57.8 OTHER SKIN CHANGES DUE TO CHRONIC EXPOSURE TO NONIONIZING RADIATION: ICD-10-CM

## 2023-05-19 DIAGNOSIS — B07.8 OTHER VIRAL WARTS: ICD-10-CM | Status: INADEQUATELY CONTROLLED

## 2023-05-19 DIAGNOSIS — R23.1 PALLOR: ICD-10-CM | Status: INADEQUATELY CONTROLLED

## 2023-05-19 PROCEDURE — 99214 OFFICE O/P EST MOD 30 MIN: CPT | Mod: 25

## 2023-05-19 PROCEDURE — ? ADDITIONAL NOTES

## 2023-05-19 PROCEDURE — ? LIQUID NITROGEN

## 2023-05-19 PROCEDURE — ? PRESCRIPTION MEDICATION MANAGEMENT

## 2023-05-19 PROCEDURE — 17110 DESTRUCTION B9 LES UP TO 14: CPT

## 2023-05-19 PROCEDURE — ? TREATMENT REGIMEN

## 2023-05-19 PROCEDURE — ? COUNSELING

## 2023-05-19 PROCEDURE — ? OBSERVATION

## 2023-05-19 PROCEDURE — ? PRESCRIPTION

## 2023-05-19 RX ORDER — IMIQUIMOD 12.5 MG/.25G
CREAM TOPICAL QD
Qty: 12 | Refills: 6 | COMMUNITY
Start: 2023-05-19

## 2023-05-19 RX ADMIN — IMIQUIMOD THIN LAYER: 12.5 CREAM TOPICAL at 00:00

## 2023-05-19 ASSESSMENT — LOCATION DETAILED DESCRIPTION DERM
LOCATION DETAILED: RIGHT DISTAL PALMAR INDEX FINGER
LOCATION DETAILED: LEFT SUPERIOR LATERAL UPPER BACK
LOCATION DETAILED: RIGHT INDEX PROXIMAL INTERPHALANGEAL JOINT
LOCATION DETAILED: LEFT POSTERIOR SHOULDER
LOCATION DETAILED: LEFT PROXIMAL PRETIBIAL REGION
LOCATION DETAILED: RIGHT PROXIMAL PRETIBIAL REGION

## 2023-05-19 ASSESSMENT — LOCATION SIMPLE DESCRIPTION DERM
LOCATION SIMPLE: RIGHT INDEX FINGER
LOCATION SIMPLE: RIGHT PRETIBIAL REGION
LOCATION SIMPLE: LEFT UPPER BACK
LOCATION SIMPLE: LEFT SHOULDER
LOCATION SIMPLE: LEFT PRETIBIAL REGION

## 2023-05-19 ASSESSMENT — LOCATION ZONE DERM
LOCATION ZONE: FINGER
LOCATION ZONE: ARM
LOCATION ZONE: TRUNK
LOCATION ZONE: LEG

## 2023-05-19 ASSESSMENT — TOTAL NUMBER OF VERRUCA VULGARIS: # OF LESIONS?: 1

## 2023-05-19 NOTE — PROCEDURE: ADDITIONAL NOTES
Additional Notes: Having Marylou and other testing for any underlying conditions.
Detail Level: Simple
Render Risk Assessment In Note?: no
Additional Notes: Also will see vein specialist

## 2023-05-19 NOTE — PROCEDURE: LIQUID NITROGEN
Add 52 Modifier (Optional): no
Medical Necessity Clause: This procedure was medically necessary because the lesions that were treated were: large and painful
Spray Paint Text: The liquid nitrogen was applied to the skin utilizing a spray paint frosting technique.
Show Applicator Variable?: Yes
Medical Necessity Information: It is in your best interest to select a reason for this procedure from the list below. All of these items fulfill various CMS LCD requirements except the new and changing color options.
Post-Care Instructions: I reviewed with the patient in detail post-care instructions. Patient is to wear sunprotection, and avoid picking at any of the treated lesions. Pt may apply Vaseline to crusted or scabbing areas.
Detail Level: Detailed
Consent: The patient's consent was obtained including but not limited to risks of crusting, scabbing, blistering, scarring, darker or lighter pigmentary change, recurrence, incomplete removal and infection.
Home

## 2023-05-19 NOTE — PROCEDURE: PRESCRIPTION MEDICATION MANAGEMENT
Detail Level: Detailed
Initiate Treatment: Imiquimod qhs to verruca. Use thin layer.
Render In Strict Bullet Format?: No

## 2023-06-01 ENCOUNTER — HEALTH MAINTENANCE LETTER (OUTPATIENT)
Age: 63
End: 2023-06-01

## 2023-09-06 ENCOUNTER — APPOINTMENT (RX ONLY)
Dept: RURAL CLINIC 4 | Facility: CLINIC | Age: 63
Setting detail: DERMATOLOGY
End: 2023-09-06

## 2023-09-06 DIAGNOSIS — L57.8 OTHER SKIN CHANGES DUE TO CHRONIC EXPOSURE TO NONIONIZING RADIATION: ICD-10-CM

## 2023-09-06 DIAGNOSIS — D22 MELANOCYTIC NEVI: ICD-10-CM

## 2023-09-06 DIAGNOSIS — L85.3 XEROSIS CUTIS: ICD-10-CM

## 2023-09-06 DIAGNOSIS — D18.0 HEMANGIOMA: ICD-10-CM

## 2023-09-06 DIAGNOSIS — L82.1 OTHER SEBORRHEIC KERATOSIS: ICD-10-CM

## 2023-09-06 DIAGNOSIS — D17 BENIGN LIPOMATOUS NEOPLASM: ICD-10-CM

## 2023-09-06 PROBLEM — D22.5 MELANOCYTIC NEVI OF TRUNK: Status: ACTIVE | Noted: 2023-09-06

## 2023-09-06 PROBLEM — D17.1 BENIGN LIPOMATOUS NEOPLASM OF SKIN AND SUBCUTANEOUS TISSUE OF TRUNK: Status: ACTIVE | Noted: 2023-09-06

## 2023-09-06 PROBLEM — D18.01 HEMANGIOMA OF SKIN AND SUBCUTANEOUS TISSUE: Status: ACTIVE | Noted: 2023-09-06

## 2023-09-06 PROCEDURE — ? COUNSELING

## 2023-09-06 PROCEDURE — 99213 OFFICE O/P EST LOW 20 MIN: CPT

## 2023-09-06 PROCEDURE — ? OBSERVATION

## 2023-09-06 PROCEDURE — ? ADDITIONAL NOTES

## 2023-09-06 ASSESSMENT — LOCATION DETAILED DESCRIPTION DERM
LOCATION DETAILED: RIGHT INFERIOR UPPER BACK
LOCATION DETAILED: EPIGASTRIC SKIN
LOCATION DETAILED: PERIUMBILICAL SKIN
LOCATION DETAILED: UPPER STERNUM

## 2023-09-06 ASSESSMENT — LOCATION SIMPLE DESCRIPTION DERM
LOCATION SIMPLE: RIGHT UPPER BACK
LOCATION SIMPLE: ABDOMEN
LOCATION SIMPLE: CHEST

## 2023-09-06 ASSESSMENT — LOCATION ZONE DERM: LOCATION ZONE: TRUNK

## 2023-09-06 NOTE — PROCEDURE: ADDITIONAL NOTES
Additional Notes: Patient was referred to [facility] for excision
Detail Level: Detailed
Render Risk Assessment In Note?: no

## 2023-09-27 ENCOUNTER — NEW PATIENT (OUTPATIENT)
Dept: URBAN - METROPOLITAN AREA CLINIC 43 | Facility: CLINIC | Age: 63
End: 2023-09-27

## 2023-09-27 DIAGNOSIS — H52.203: ICD-10-CM

## 2023-09-27 DIAGNOSIS — Z01.00: ICD-10-CM

## 2023-09-27 DIAGNOSIS — H52.4: ICD-10-CM

## 2023-09-27 DIAGNOSIS — H43.811: ICD-10-CM

## 2023-09-27 DIAGNOSIS — H52.13: ICD-10-CM

## 2023-09-27 PROCEDURE — 92004 COMPRE OPH EXAM NEW PT 1/>: CPT

## 2023-09-27 PROCEDURE — 92015 DETERMINE REFRACTIVE STATE: CPT

## 2023-09-27 ASSESSMENT — VISUAL ACUITY
OS_SC: 20/70+2
OD_CC: 20/20-2
OD_SC: J12
OS_SC: J1
OS_CC: 20/20-1
OD_SC: 20/20

## 2023-10-18 ENCOUNTER — APPOINTMENT (RX ONLY)
Dept: RURAL CLINIC 4 | Facility: CLINIC | Age: 63
Setting detail: DERMATOLOGY
End: 2023-10-18

## 2024-02-21 ENCOUNTER — APPOINTMENT (RX ONLY)
Dept: RURAL CLINIC 4 | Facility: CLINIC | Age: 64
Setting detail: DERMATOLOGY
End: 2024-02-21

## 2024-02-21 DIAGNOSIS — D22 MELANOCYTIC NEVI: ICD-10-CM

## 2024-02-21 DIAGNOSIS — L85.3 XEROSIS CUTIS: ICD-10-CM

## 2024-02-21 DIAGNOSIS — L82.1 OTHER SEBORRHEIC KERATOSIS: ICD-10-CM

## 2024-02-21 DIAGNOSIS — D18.0 HEMANGIOMA: ICD-10-CM

## 2024-02-21 DIAGNOSIS — L57.8 OTHER SKIN CHANGES DUE TO CHRONIC EXPOSURE TO NONIONIZING RADIATION: ICD-10-CM

## 2024-02-21 PROBLEM — D22.5 MELANOCYTIC NEVI OF TRUNK: Status: ACTIVE | Noted: 2024-02-21

## 2024-02-21 PROBLEM — D18.01 HEMANGIOMA OF SKIN AND SUBCUTANEOUS TISSUE: Status: ACTIVE | Noted: 2024-02-21

## 2024-02-21 PROCEDURE — 99213 OFFICE O/P EST LOW 20 MIN: CPT

## 2024-02-21 PROCEDURE — ? COUNSELING

## 2024-02-21 ASSESSMENT — LOCATION SIMPLE DESCRIPTION DERM
LOCATION SIMPLE: ABDOMEN
LOCATION SIMPLE: CHEST

## 2024-02-21 ASSESSMENT — LOCATION ZONE DERM: LOCATION ZONE: TRUNK

## 2024-02-21 ASSESSMENT — LOCATION DETAILED DESCRIPTION DERM
LOCATION DETAILED: UPPER STERNUM
LOCATION DETAILED: PERIUMBILICAL SKIN
LOCATION DETAILED: EPIGASTRIC SKIN

## 2024-09-25 ENCOUNTER — APPOINTMENT (RX ONLY)
Dept: RURAL CLINIC 4 | Facility: CLINIC | Age: 64
Setting detail: DERMATOLOGY
End: 2024-09-25

## 2024-09-25 DIAGNOSIS — L82.1 OTHER SEBORRHEIC KERATOSIS: ICD-10-CM

## 2024-09-25 DIAGNOSIS — D18.0 HEMANGIOMA: ICD-10-CM

## 2024-09-25 DIAGNOSIS — T07XXXA ABRASION OR FRICTION BURN OF OTHER, MULTIPLE, AND UNSPECIFIED SITES, WITHOUT MENTION OF INFECTION: ICD-10-CM

## 2024-09-25 DIAGNOSIS — D22 MELANOCYTIC NEVI: ICD-10-CM

## 2024-09-25 DIAGNOSIS — L57.8 OTHER SKIN CHANGES DUE TO CHRONIC EXPOSURE TO NONIONIZING RADIATION: ICD-10-CM

## 2024-09-25 DIAGNOSIS — L85.3 XEROSIS CUTIS: ICD-10-CM

## 2024-09-25 PROBLEM — D18.01 HEMANGIOMA OF SKIN AND SUBCUTANEOUS TISSUE: Status: ACTIVE | Noted: 2024-09-25

## 2024-09-25 PROBLEM — D22.5 MELANOCYTIC NEVI OF TRUNK: Status: ACTIVE | Noted: 2024-09-25

## 2024-09-25 PROBLEM — S60.511A ABRASION OF RIGHT HAND, INITIAL ENCOUNTER: Status: ACTIVE | Noted: 2024-09-25

## 2024-09-25 PROCEDURE — 99213 OFFICE O/P EST LOW 20 MIN: CPT

## 2024-09-25 PROCEDURE — ? COUNSELING

## 2024-09-25 ASSESSMENT — LOCATION DETAILED DESCRIPTION DERM
LOCATION DETAILED: EPIGASTRIC SKIN
LOCATION DETAILED: RIGHT THENAR EMINENCE
LOCATION DETAILED: PERIUMBILICAL SKIN
LOCATION DETAILED: UPPER STERNUM

## 2024-09-25 ASSESSMENT — LOCATION SIMPLE DESCRIPTION DERM
LOCATION SIMPLE: RIGHT HAND
LOCATION SIMPLE: CHEST
LOCATION SIMPLE: ABDOMEN

## 2024-09-25 ASSESSMENT — LOCATION ZONE DERM
LOCATION ZONE: HAND
LOCATION ZONE: TRUNK

## 2024-10-30 ENCOUNTER — COMPREHENSIVE EXAM (OUTPATIENT)
Dept: URBAN - METROPOLITAN AREA CLINIC 43 | Facility: CLINIC | Age: 64
End: 2024-10-30

## 2024-10-30 DIAGNOSIS — Z01.00: ICD-10-CM

## 2024-10-30 DIAGNOSIS — H52.4: ICD-10-CM

## 2024-10-30 DIAGNOSIS — H52.203: ICD-10-CM

## 2024-10-30 DIAGNOSIS — H52.13: ICD-10-CM

## 2024-10-30 DIAGNOSIS — H43.811: ICD-10-CM

## 2024-10-30 PROCEDURE — 92014 COMPRE OPH EXAM EST PT 1/>: CPT

## 2024-10-30 PROCEDURE — 92015 DETERMINE REFRACTIVE STATE: CPT

## 2025-02-19 ENCOUNTER — APPOINTMENT (OUTPATIENT)
Dept: RURAL CLINIC 4 | Facility: CLINIC | Age: 65
Setting detail: DERMATOLOGY
End: 2025-02-19

## 2025-02-19 DIAGNOSIS — D18.0 HEMANGIOMA: ICD-10-CM

## 2025-02-19 DIAGNOSIS — L82.1 OTHER SEBORRHEIC KERATOSIS: ICD-10-CM

## 2025-02-19 DIAGNOSIS — L85.3 XEROSIS CUTIS: ICD-10-CM

## 2025-02-19 DIAGNOSIS — L57.8 OTHER SKIN CHANGES DUE TO CHRONIC EXPOSURE TO NONIONIZING RADIATION: ICD-10-CM

## 2025-02-19 DIAGNOSIS — D22 MELANOCYTIC NEVI: ICD-10-CM

## 2025-02-19 PROBLEM — D18.01 HEMANGIOMA OF SKIN AND SUBCUTANEOUS TISSUE: Status: ACTIVE | Noted: 2025-02-19

## 2025-02-19 PROBLEM — D22.5 MELANOCYTIC NEVI OF TRUNK: Status: ACTIVE | Noted: 2025-02-19

## 2025-02-19 PROCEDURE — 99213 OFFICE O/P EST LOW 20 MIN: CPT

## 2025-02-19 PROCEDURE — ? COUNSELING

## 2025-02-19 ASSESSMENT — LOCATION ZONE DERM: LOCATION ZONE: TRUNK

## 2025-02-19 ASSESSMENT — LOCATION SIMPLE DESCRIPTION DERM
LOCATION SIMPLE: ABDOMEN
LOCATION SIMPLE: CHEST